# Patient Record
Sex: MALE | Race: WHITE | NOT HISPANIC OR LATINO | Employment: FULL TIME | URBAN - METROPOLITAN AREA
[De-identification: names, ages, dates, MRNs, and addresses within clinical notes are randomized per-mention and may not be internally consistent; named-entity substitution may affect disease eponyms.]

---

## 2017-10-20 ENCOUNTER — GENERIC CONVERSION - ENCOUNTER (OUTPATIENT)
Dept: OTHER | Facility: OTHER | Age: 37
End: 2017-10-20

## 2017-10-20 ENCOUNTER — GENERIC CONVERSION - ENCOUNTER (OUTPATIENT)
Dept: FAMILY MEDICINE CLINIC | Facility: CLINIC | Age: 37
End: 2017-10-20

## 2018-01-22 VITALS — SYSTOLIC BLOOD PRESSURE: 136 MMHG | DIASTOLIC BLOOD PRESSURE: 90 MMHG

## 2018-01-22 VITALS
DIASTOLIC BLOOD PRESSURE: 100 MMHG | BODY MASS INDEX: 33.85 KG/M2 | HEART RATE: 76 BPM | SYSTOLIC BLOOD PRESSURE: 146 MMHG | WEIGHT: 236.44 LBS | HEIGHT: 70 IN | TEMPERATURE: 97.1 F | RESPIRATION RATE: 18 BRPM

## 2018-02-02 ENCOUNTER — OFFICE VISIT (OUTPATIENT)
Dept: FAMILY MEDICINE CLINIC | Facility: CLINIC | Age: 38
End: 2018-02-02
Payer: COMMERCIAL

## 2018-02-02 VITALS
TEMPERATURE: 96.6 F | SYSTOLIC BLOOD PRESSURE: 120 MMHG | RESPIRATION RATE: 16 BRPM | HEART RATE: 72 BPM | BODY MASS INDEX: 34.94 KG/M2 | DIASTOLIC BLOOD PRESSURE: 88 MMHG | HEIGHT: 71 IN | WEIGHT: 249.6 LBS

## 2018-02-02 DIAGNOSIS — J01.90 ACUTE NON-RECURRENT SINUSITIS, UNSPECIFIED LOCATION: Primary | ICD-10-CM

## 2018-02-02 PROCEDURE — 99213 OFFICE O/P EST LOW 20 MIN: CPT | Performed by: NURSE PRACTITIONER

## 2018-02-02 RX ORDER — AMOXICILLIN AND CLAVULANATE POTASSIUM 875; 125 MG/1; MG/1
1 TABLET, FILM COATED ORAL EVERY 12 HOURS SCHEDULED
Qty: 20 TABLET | Refills: 0 | Status: SHIPPED | OUTPATIENT
Start: 2018-02-02 | End: 2018-02-12

## 2018-02-02 NOTE — PROGRESS NOTES
FAMILY PRACTICE OFFICE VISIT       NAME: Neeru Shultz  AGE: 40 y o  SEX: male       : 1980        MRN: [id number]    DATE: 2018  TIME: 9:55 PM    Assessment and Plan     Problem List Items Addressed This Visit     None      Visit Diagnoses     Acute non-recurrent sinusitis, unspecified location    -  Primary    Relevant Medications    amoxicillin-clavulanate (AUGMENTIN) 875-125 mg per tablet            Patient Instructions   Rhinosinusitis   WHAT YOU NEED TO KNOW:   Rhinosinusitis (RS) is inflammation of your nose and sinuses  It commonly begins as a virus, often as a common cold  Viruses usually last 7 to 10 days and do not need treatment  When the virus does not get better on its own, you may have bacterial RS  This means that bacteria have begun to grow inside your sinuses  Acute RS lasts less than 4 weeks  Chronic RS lasts 12 weeks or more  Recurrent RS is when you have 4 or more episodes of RS in one year  DISCHARGE INSTRUCTIONS:   Return to the emergency department if:   · Your eye and eyelid are red, swollen, and painful  · You cannot open your eye  · You have double vision or you cannot see  · Your eyeball bulges out or you cannot move your eye  · You are more sleepy than normal or you notice changes in your ability to think, move, or talk  · You have a stiff neck, a fever, or a bad headache  · You have swelling of your forehead or scalp  Contact your healthcare provider if:   · Your symptoms are worse or do not improve after 3 to 5 days of treatment  · You have questions or concerns about your condition or care  Medicines: You may need any of the following:  · Acetaminophen  decreases pain and fever  It is available without a doctor's order  Ask how much to take and how often to take it  Follow directions  Acetaminophen can cause liver damage if not taken correctly  · NSAIDs , such as ibuprofen, help decrease swelling, pain, and fever   This medicine is available with or without a doctor's order  NSAIDs can cause stomach bleeding or kidney problems in certain people  If you take blood thinner medicine, always ask your healthcare provider if NSAIDs are safe for you  Always read the medicine label and follow directions  · Nasal steroid sprays  decrease inflammation in your nose and sinuses  · Decongestants  reduce swelling and drain mucus in the nose and sinuses  They may help you breathe easier  · Antihistamines  dry mucus in the nose and relieve sneezing  · Antibiotics  treat a bacterial infection and may be needed if your symptoms do not improve or they get worse  · Take your medicine as directed  Contact your healthcare provider if you think your medicine is not helping or if you have side effects  Tell him or her if you are allergic to any medicine  Keep a list of the medicines, vitamins, and herbs you take  Include the amounts, and when and why you take them  Bring the list or the pill bottles to follow-up visits  Carry your medicine list with you in case of an emergency  Self-care:   · Rinse your sinuses  Use a sinus rinse device to rinse your nasal passages with a saline (salt water) solution  This will help thin the mucus in your nose and rinse away pollen and dirt  It will also help reduce swelling so you can breathe normally  Ask your healthcare provider how often to do this  · Breathe in steam   Heat a bowl of water until you see steam  Lean over the bowl and make a tent over your head with a large towel  Breathe deeply for about 20 minutes  Be careful not to get too close to the steam or burn yourself  Do this 3 times a day  You can also breathe deeply when you take a hot shower  · Sleep with your head elevated  Place an extra pillow under your head before you go to sleep to help your sinuses drain  · Drink liquids as directed    Ask your healthcare provider how much liquid to drink each day and which liquids are best for you  Liquids will thin the mucus in your nose and help it drain  Avoid drinks that contain alcohol or caffeine  · Do not smoke, and avoid secondhand smoke  Nicotine and other chemicals in cigarettes and cigars can make your symptoms worse  Ask your healthcare provider for information if you currently smoke and need help to quit  E-cigarettes or smokeless tobacco still contain nicotine  Talk to your healthcare provider before you use these products  Follow up with your healthcare provider as directed: Follow up if your symptoms are worse or not better after 3 to 5 days of treatment  Write down your questions so you remember to ask them during your visits  © 2017 [address] [employer] Information is for End User's use only and may not be sold, redistributed or otherwise used for commercial purposes  All illustrations and images included in CareNotes® are the copyrighted property of [employer]  or Jelani Rincon  The above information is an  only  It is not intended as medical advice for individual conditions or treatments  Talk to your doctor, nurse or pharmacist before following any medical regimen to see if it is safe and effective for you  1  Acute non-recurrent sinusitis, unspecified location  amoxicillin-clavulanate (AUGMENTIN) 875-125 mg per tablet     Symptoms consistent with sinusitis  Recommend treatment with a course of Augmentin 875-125 mg twice daily for 10 days as prescribed  Recommend taking OTC probiotics or eating yogurt daily while taking antibiotics  Continue supportive care:  Rest, increase fluids, warm fluids, honey, and humidification  May use Tylenol or ibuprofen as needed  If symptoms worsen, or if symptoms do not improve over the next 3 days, instructed to call the office         Chief Complaint     Chief Complaint   Patient presents with    Cold Like Symptoms     Pt is here w/ sinus pressure, chest congestion, cough and sneezing times 4 days        History of Present Illness     Patient presents today with nasal congestion, sore throat, cough, sneezing, sinus pressure, and headaches for the past 4 days  He has been using OTC decongestants with no relief  Both his wife and mother in law are sick with similar symptoms  Review of Systems   Review of Systems   Constitutional: Positive for fatigue  Negative for chills and fever  HENT: Positive for congestion, postnasal drip, rhinorrhea, sinus pain, sinus pressure, sneezing and sore throat  Negative for ear pain (pressure, no pain)  Respiratory: Positive for cough  Negative for chest tightness and shortness of breath  Cardiovascular: Positive for chest pain  Gastrointestinal: Negative for diarrhea, nausea and vomiting  Musculoskeletal: Negative for myalgias  Neurological: Positive for headaches  Negative for dizziness  Active Problem List   There is no problem list on file for this patient  Past Medical History:  Past Medical History:   Diagnosis Date    Diverticulitis large intestine        Past Surgical History:  Past Surgical History:   Procedure Laterality Date    VASECTOMY         Family History:  No family history on file  Social History:  Social History     Social History    Marital status: /Civil Union     Spouse name: N/A    Number of children: N/A    Years of education: N/A     Occupational History    Not on file  Social History Main Topics    Smoking status: Never Smoker    Smokeless tobacco: Never Used    Alcohol use Yes      Comment: occasional     Drug use: No    Sexual activity: Not on file     Other Topics Concern    Not on file     Social History Narrative    No narrative on file       I have reviewed the patient's medical history in detail; there are no changes to the history as noted in the electronic medical record      Objective     Vitals:    02/02/18 1108   BP: 120/88   BP Location: Right arm   Patient Position: Sitting Cuff Size: Adult   Pulse: 72   Resp: 16   Temp: (!) 96 6 °F (35 9 °C)   TempSrc: Tympanic   Weight: 113 kg (249 lb 9 6 oz)   Height: 5' 10 5" (1 791 m)     Wt Readings from Last 3 Encounters:   02/02/18 113 kg (249 lb 9 6 oz)   10/20/17 107 kg (236 lb 7 oz)   02/11/16 102 kg (224 lb 6 oz)       Physical Exam   Constitutional: He is oriented to person, place, and time  He appears well-developed and well-nourished  HENT:   Head: Normocephalic and atraumatic  Right Ear: Tympanic membrane and ear canal normal    Left Ear: Tympanic membrane and ear canal normal    Nose: Mucosal edema present  Mouth/Throat: Posterior oropharyngeal erythema (copious amount of thick post nasal drip) present  Eyes: Conjunctivae are normal    Neck: Normal range of motion  Neck supple  Cardiovascular: Normal rate, regular rhythm and normal heart sounds  No murmur heard  Pulmonary/Chest: Effort normal and breath sounds normal    Musculoskeletal: He exhibits no edema  Lymphadenopathy:     He has cervical adenopathy  Neurological: He is alert and oriented to person, place, and time  Psychiatric: He has a normal mood and affect  Nursing note and vitals reviewed  ALLERGIES:  No Known Allergies    Current Medications     Current Outpatient Prescriptions   Medication Sig Dispense Refill    amoxicillin-clavulanate (AUGMENTIN) 875-125 mg per tablet Take 1 tablet by mouth every 12 (twelve) hours for 10 days 20 tablet 0     No current facility-administered medications for this visit            Health Maintenance     Health Maintenance   Topic Date Due    HIV SCREENING  1980    DTaP,Tdap,and Td Vaccines (1 - Tdap) 09/14/1987    INFLUENZA VACCINE  09/01/2017     Immunization History   Administered Date(s) Administered    Influenza Quadrivalent Preservative Free 3 years and older IM 11/08/2014, 11/12/2015    Tuberculin Skin Test-PPD Intradermal 11/10/2015       PAULINA Tejeda

## 2018-02-02 NOTE — PATIENT INSTRUCTIONS
Rhinosinusitis   WHAT YOU NEED TO KNOW:   Rhinosinusitis (RS) is inflammation of your nose and sinuses  It commonly begins as a virus, often as a common cold  Viruses usually last 7 to 10 days and do not need treatment  When the virus does not get better on its own, you may have bacterial RS  This means that bacteria have begun to grow inside your sinuses  Acute RS lasts less than 4 weeks  Chronic RS lasts 12 weeks or more  Recurrent RS is when you have 4 or more episodes of RS in one year  DISCHARGE INSTRUCTIONS:   Return to the emergency department if:   · Your eye and eyelid are red, swollen, and painful  · You cannot open your eye  · You have double vision or you cannot see  · Your eyeball bulges out or you cannot move your eye  · You are more sleepy than normal or you notice changes in your ability to think, move, or talk  · You have a stiff neck, a fever, or a bad headache  · You have swelling of your forehead or scalp  Contact your healthcare provider if:   · Your symptoms are worse or do not improve after 3 to 5 days of treatment  · You have questions or concerns about your condition or care  Medicines: You may need any of the following:  · Acetaminophen  decreases pain and fever  It is available without a doctor's order  Ask how much to take and how often to take it  Follow directions  Acetaminophen can cause liver damage if not taken correctly  · NSAIDs , such as ibuprofen, help decrease swelling, pain, and fever  This medicine is available with or without a doctor's order  NSAIDs can cause stomach bleeding or kidney problems in certain people  If you take blood thinner medicine, always ask your healthcare provider if NSAIDs are safe for you  Always read the medicine label and follow directions  · Nasal steroid sprays  decrease inflammation in your nose and sinuses  · Decongestants  reduce swelling and drain mucus in the nose and sinuses   They may help you breathe easier  · Antihistamines  dry mucus in the nose and relieve sneezing  · Antibiotics  treat a bacterial infection and may be needed if your symptoms do not improve or they get worse  · Take your medicine as directed  Contact your healthcare provider if you think your medicine is not helping or if you have side effects  Tell him or her if you are allergic to any medicine  Keep a list of the medicines, vitamins, and herbs you take  Include the amounts, and when and why you take them  Bring the list or the pill bottles to follow-up visits  Carry your medicine list with you in case of an emergency  Self-care:   · Rinse your sinuses  Use a sinus rinse device to rinse your nasal passages with a saline (salt water) solution  This will help thin the mucus in your nose and rinse away pollen and dirt  It will also help reduce swelling so you can breathe normally  Ask your healthcare provider how often to do this  · Breathe in steam   Heat a bowl of water until you see steam  Lean over the bowl and make a tent over your head with a large towel  Breathe deeply for about 20 minutes  Be careful not to get too close to the steam or burn yourself  Do this 3 times a day  You can also breathe deeply when you take a hot shower  · Sleep with your head elevated  Place an extra pillow under your head before you go to sleep to help your sinuses drain  · Drink liquids as directed  Ask your healthcare provider how much liquid to drink each day and which liquids are best for you  Liquids will thin the mucus in your nose and help it drain  Avoid drinks that contain alcohol or caffeine  · Do not smoke, and avoid secondhand smoke  Nicotine and other chemicals in cigarettes and cigars can make your symptoms worse  Ask your healthcare provider for information if you currently smoke and need help to quit  E-cigarettes or smokeless tobacco still contain nicotine   Talk to your healthcare provider before you use these products  Follow up with your healthcare provider as directed: Follow up if your symptoms are worse or not better after 3 to 5 days of treatment  Write down your questions so you remember to ask them during your visits  © 2017 [employer] [employer] Information is for End User's use only and may not be sold, redistributed or otherwise used for commercial purposes  All illustrations and images included in CareNotes® are the copyrighted property of [employer]  or Jelani Rincon  The above information is an  only  It is not intended as medical advice for individual conditions or treatments  Talk to your doctor, nurse or pharmacist before following any medical regimen to see if it is safe and effective for you

## 2018-02-05 NOTE — PROGRESS NOTES
I have reviewed the notes, assessments, and/or procedures performed by PAULINA Manzano, I concur with her/his documentation of Som Wayne

## 2018-09-04 ENCOUNTER — OFFICE VISIT (OUTPATIENT)
Dept: FAMILY MEDICINE CLINIC | Facility: CLINIC | Age: 38
End: 2018-09-04
Payer: COMMERCIAL

## 2018-09-04 VITALS
RESPIRATION RATE: 16 BRPM | HEIGHT: 70 IN | WEIGHT: 240 LBS | TEMPERATURE: 96.5 F | SYSTOLIC BLOOD PRESSURE: 132 MMHG | HEART RATE: 62 BPM | BODY MASS INDEX: 34.36 KG/M2 | DIASTOLIC BLOOD PRESSURE: 80 MMHG

## 2018-09-04 DIAGNOSIS — K57.92 ACUTE DIVERTICULITIS: Primary | ICD-10-CM

## 2018-09-04 PROCEDURE — 1036F TOBACCO NON-USER: CPT | Performed by: FAMILY MEDICINE

## 2018-09-04 PROCEDURE — 99214 OFFICE O/P EST MOD 30 MIN: CPT | Performed by: FAMILY MEDICINE

## 2018-09-04 PROCEDURE — 3008F BODY MASS INDEX DOCD: CPT | Performed by: FAMILY MEDICINE

## 2018-09-04 RX ORDER — METRONIDAZOLE 500 MG/1
500 TABLET ORAL 3 TIMES DAILY
Qty: 30 TABLET | Refills: 0 | Status: SHIPPED | OUTPATIENT
Start: 2018-09-04 | End: 2018-09-14

## 2018-09-04 RX ORDER — CIPROFLOXACIN 500 MG/1
500 TABLET, FILM COATED ORAL EVERY 12 HOURS SCHEDULED
Qty: 20 TABLET | Refills: 0 | Status: SHIPPED | OUTPATIENT
Start: 2018-09-04 | End: 2018-09-14

## 2018-09-04 NOTE — PROGRESS NOTES
FAMILY PRACTICE OFFICE VISIT       NAME: Genesis Shultz  AGE: 40 y o  SEX: male       : 1980        MRN: [id number]        Assessment and Plan     Problem List Items Addressed This Visit     None      Visit Diagnoses     Acute diverticulitis    -  Primary    Relevant Medications    ciprofloxacin (CIPRO) 500 mg tablet    metroNIDAZOLE (FLAGYL) 500 mg tablet    Other Relevant Orders    Ambulatory referral to Colorectal Surgery       Patient presents for evaluation of level quadrant abdominal pain  History and physical are consistent with diverticulitis  Will start patient on combination of Cipro and Flagyl for 10 days  Patient had multiple episodes of diverticulitis within past 6-7 years  He was evaluated with CT scan in  and colonoscopy back in   Patient will start clear liquid diet and advance as tolerated  Referral to Colorectal surgery for evaluation , I believe patient will be a candidate for repeat colonoscopy in 4-8 weeks  Patient will contact me if his symptoms will not improve significantly in 48 hours  There are no Patient Instructions on file for this visit  Chief Complaint     Chief Complaint   Patient presents with    Diverticulitis     Patient is here due to having a flare up x 2 days  History of Present Illness     Abdominal pain and fatigue  For 2-3 days    concerned about  Diverticulitis   last attack 18 months ago    Last BM this am -  Small , no blood, no BRBPR   nauseated last week, no nausea or vomiting now  No recent travel  Increased stress lately    no fever , no chills     Last CT     last colonoscopy  , [facility] Robledo  First attack in    Likely  6-7 th attack   Mother-  Colitis /  Gluten  intolerant    Abdominal Pain   This is a new problem  The current episode started yesterday  The onset quality is gradual  The problem occurs constantly  The problem has been unchanged  The pain is located in the LLQ   The quality of the pain is aching, colicky and cramping  The abdominal pain does not radiate  Associated symptoms include anorexia and a fever  Pertinent negatives include no diarrhea  Nothing aggravates the pain  The pain is relieved by nothing  He has tried nothing for the symptoms  Prior diagnostic workup includes GI consult, lower endoscopy and CT scan  Review of Systems   Review of Systems   Constitutional: Positive for fever  HENT: Negative  Eyes: Negative  Respiratory: Negative  Cardiovascular: Negative  Gastrointestinal: Positive for abdominal pain and anorexia  Negative for diarrhea  Endocrine: Negative  Genitourinary: Negative  Musculoskeletal: Negative  Skin: Negative  Allergic/Immunologic: Negative  Neurological: Negative  Psychiatric/Behavioral: Negative  Active Problem List   There is no problem list on file for this patient  Past Medical History:  Past Medical History:   Diagnosis Date    Diverticulitis large intestine        Past Surgical History:  Past Surgical History:   Procedure Laterality Date    VASECTOMY  01/19/2016    Vas Deferens       Family History:  No family history on file  Social History:  Social History     Social History    Marital status: /Civil Union     Spouse name: N/A    Number of children: N/A    Years of education: N/A     Occupational History    Not on file       Social History Main Topics    Smoking status: Never Smoker    Smokeless tobacco: Never Used    Alcohol use Yes      Comment: occasional     Drug use: No    Sexual activity: Not on file     Other Topics Concern    Not on file     Social History Narrative    No narrative on file       Objective     Vitals:    09/04/18 1126 09/04/18 1200   BP: 158/100 132/80   Pulse: 62    Resp: 16    Temp: (!) 96 5 °F (35 8 °C)    TempSrc: Tympanic    Weight: 109 kg (240 lb)    Height: 5' 10" (1 778 m)        Vitals:    09/04/18 1200   BP: 132/80   Pulse:    Resp:    Temp:      Wt Readings from Last 3 Encounters:   09/04/18 109 kg (240 lb)   02/02/18 113 kg (249 lb 9 6 oz)   10/20/17 107 kg (236 lb 7 oz)       Physical Exam   Constitutional: He is oriented to person, place, and time  He appears well-developed and well-nourished  HENT:   Head: Normocephalic and atraumatic  Eyes: Conjunctivae are normal    Neck: Neck supple  Carotid bruit is not present  Cardiovascular: Normal rate, regular rhythm and normal heart sounds  No murmur heard  Pulmonary/Chest: Effort normal and breath sounds normal  No respiratory distress  He has no wheezes  He has no rales  Abdominal: Normal appearance and bowel sounds are normal  He exhibits no abdominal bruit  There is tenderness in the left lower quadrant  There is rebound  There is no rigidity, no guarding and no CVA tenderness  Musculoskeletal: Normal range of motion  He exhibits no edema  Neurological: He is alert and oriented to person, place, and time  No cranial nerve deficit  Psychiatric: He has a normal mood and affect  His behavior is normal    Nursing note and vitals reviewed        Pertinent Laboratory/Diagnostic Studies:  Lab Results   Component Value Date    GLUCOSE 108 02/21/2014    BUN 21 02/21/2014    CREATININE 0 94 02/21/2014    CALCIUM 8 2 (L) 02/21/2014     02/21/2014    K 4 1 02/21/2014    CO2 31 02/21/2014     02/21/2014     Lab Results   Component Value Date    ALT 30 02/21/2014    AST 18 02/21/2014    ALKPHOS 56 02/21/2014    BILITOT 1 4 (H) 02/21/2014       Lab Results   Component Value Date    WBC 10 28 (H) 02/21/2014    HGB 15 6 02/21/2014    HCT 44 0 02/21/2014    MCV 86 02/21/2014     02/21/2014       No results found for: TSH    No results found for: CHOL  No results found for: TRIG  No results found for: HDL  No results found for: LDLCALC  No results found for: HGBA1C    Results for orders placed or performed in visit on 03/21/16   Semen analysis, post-vasectomy   Result Value Ref Range    Volume 5 4 (H) 1 0 - 5 0 mL    Viscosity 4 3 - 4    WBC's 0 <1 hpf    pH, Fluid 8 5 7 2 - 8 6    Specimen Collection Home     Post Vas Comment Azoospermia  Specimen Pelleted        Orders Placed This Encounter   Procedures    Ambulatory referral to Colorectal Surgery       ALLERGIES:  No Known Allergies    Current Medications     Current Outpatient Prescriptions   Medication Sig Dispense Refill    ciprofloxacin (CIPRO) 500 mg tablet Take 1 tablet (500 mg total) by mouth every 12 (twelve) hours for 10 days 20 tablet 0    metroNIDAZOLE (FLAGYL) 500 mg tablet Take 1 tablet (500 mg total) by mouth 3 (three) times a day for 10 days 30 tablet 0     No current facility-administered medications for this visit            Health Maintenance     Health Maintenance   Topic Date Due    INFLUENZA VACCINE  09/04/2019 (Originally 9/1/2018)    DTaP,Tdap,and Td Vaccines (1 - Tdap) 09/04/2019 (Originally 9/14/2001)    Depression Screening PHQ  09/04/2019     Immunization History   Administered Date(s) Administered    Influenza Quadrivalent Preservative Free 3 years and older IM 11/08/2014, 11/12/2015    Tuberculin Skin Test-PPD Intradermal 11/10/2015       Vivek Jay MD

## 2018-09-07 ENCOUNTER — TELEPHONE (OUTPATIENT)
Dept: FAMILY MEDICINE CLINIC | Facility: CLINIC | Age: 38
End: 2018-09-07

## 2018-09-07 NOTE — TELEPHONE ENCOUNTER
If his symptoms are not improving-we need to proceed with CT scan of abdomen and pelvis  I would strongly advised him to go to the nearest emergency room as they can provide him the fast is care with IV fluids, CT scan and possibly IV antibiotics    Please contact patient immediately, thank you

## 2018-09-07 NOTE — TELEPHONE ENCOUNTER
Patient is aware of MD instructions  Patient states that his symptoms have improved since earlier and doesn't think he needs to go to the ER at this time   But the patient will give the office a call if he decides to go to the hospital

## 2018-12-12 ENCOUNTER — OFFICE VISIT (OUTPATIENT)
Dept: FAMILY MEDICINE CLINIC | Facility: CLINIC | Age: 38
End: 2018-12-12
Payer: COMMERCIAL

## 2018-12-12 VITALS
HEART RATE: 56 BPM | TEMPERATURE: 97.9 F | HEIGHT: 70 IN | BODY MASS INDEX: 32.81 KG/M2 | DIASTOLIC BLOOD PRESSURE: 70 MMHG | RESPIRATION RATE: 16 BRPM | SYSTOLIC BLOOD PRESSURE: 122 MMHG | WEIGHT: 229.2 LBS

## 2018-12-12 DIAGNOSIS — J32.9 SINUSITIS, UNSPECIFIED CHRONICITY, UNSPECIFIED LOCATION: Primary | ICD-10-CM

## 2018-12-12 PROCEDURE — 1036F TOBACCO NON-USER: CPT | Performed by: FAMILY MEDICINE

## 2018-12-12 PROCEDURE — 99213 OFFICE O/P EST LOW 20 MIN: CPT | Performed by: FAMILY MEDICINE

## 2018-12-12 PROCEDURE — 3008F BODY MASS INDEX DOCD: CPT | Performed by: FAMILY MEDICINE

## 2018-12-12 RX ORDER — CEFUROXIME AXETIL 500 MG/1
500 TABLET ORAL EVERY 12 HOURS SCHEDULED
Qty: 20 TABLET | Refills: 0 | Status: SHIPPED | OUTPATIENT
Start: 2018-12-12 | End: 2018-12-22

## 2018-12-12 NOTE — PROGRESS NOTES
FAMILY PRACTICE OFFICE VISIT       NAME: Roxi Salgado  AGE: 45 y o  SEX: male       : 1980        MRN: [id number]    DATE: 2018  TIME: 10:45 AM    Assessment and Plan     Problem List Items Addressed This Visit     Sinusitis - Primary    Relevant Medications    cefuroxime (CEFTIN) 500 mg tablet        Patient given prescription for Ceftin 500 mg 1 b i d  For 10 days  He may continue to take his Mucinex 1 b i d  As needed for symptomatic relief  There are no Patient Instructions on file for this visit  Chief Complaint     Chief Complaint   Patient presents with    Sinusitis    Sore Throat       History of Present Illness     HPI    Review of Systems   Review of Systems   Constitutional: Positive for fatigue  Negative for fever  HENT: Positive for congestion and sinus pressure  Respiratory: Negative  Cardiovascular: Negative  Gastrointestinal: Negative  Skin: Negative  Active Problem List     Patient Active Problem List   Diagnosis    Sinusitis       Past Medical History:  Past Medical History:   Diagnosis Date    Diverticulitis large intestine        Past Surgical History:  Past Surgical History:   Procedure Laterality Date    VASECTOMY  2016    Vas Deferens       Family History:  History reviewed  No pertinent family history  Social History:  Social History     Social History    Marital status: /Civil Union     Spouse name: N/A    Number of children: N/A    Years of education: N/A     Occupational History    Not on file       Social History Main Topics    Smoking status: Never Smoker    Smokeless tobacco: Never Used    Alcohol use Yes      Comment: occasional     Drug use: No    Sexual activity: Not on file     Other Topics Concern    Not on file     Social History Narrative    No narrative on file       Objective     Vitals:    18 1032   BP: 122/70   Pulse: 56   Resp: 16   Temp: 97 9 °F (36 6 °C)     Wt Readings from Last 3 Encounters:   12/12/18 104 kg (229 lb 3 2 oz)   09/04/18 109 kg (240 lb)   02/02/18 113 kg (249 lb 9 6 oz)       Physical Exam   Constitutional: No distress  HENT:   Mouth/Throat: Oropharynx is clear and moist  No oropharyngeal exudate  Tympanic membranes within normal limits bilaterally  Increased mucosal swelling bilateral nasal passages   Cardiovascular:   Regular rate and rhythm with no murmurs   Pulmonary/Chest:   Lungs are clear to auscultation without wheezes,rales, or rhonchi   Lymphadenopathy:     He has no cervical adenopathy  Skin: No rash noted  Pertinent Laboratory/Diagnostic Studies:  Lab Results   Component Value Date    GLUCOSE 108 02/21/2014    BUN 21 02/21/2014    CREATININE 0 94 02/21/2014    CALCIUM 8 2 (L) 02/21/2014     02/21/2014    K 4 1 02/21/2014    CO2 31 02/21/2014     02/21/2014     Lab Results   Component Value Date    ALT 30 02/21/2014    AST 18 02/21/2014    ALKPHOS 56 02/21/2014    BILITOT 1 4 (H) 02/21/2014       Lab Results   Component Value Date    WBC 10 28 (H) 02/21/2014    HGB 15 6 02/21/2014    HCT 44 0 02/21/2014    MCV 86 02/21/2014     02/21/2014       No results found for: TSH    No results found for: CHOL  No results found for: TRIG  No results found for: HDL  No results found for: LDLCALC  No results found for: HGBA1C    Results for orders placed or performed in visit on 03/21/16   Semen analysis, post-vasectomy   Result Value Ref Range    Volume 5 4 (H) 1 0 - 5 0 mL    Viscosity 4 3 - 4    WBC's 0 <1 hpf    pH, Fluid 8 5 7 2 - 8 6    Specimen Collection Home     Post Vas Comment Azoospermia  Specimen Pelleted        No orders of the defined types were placed in this encounter        ALLERGIES:  No Known Allergies    Current Medications     Current Outpatient Prescriptions   Medication Sig Dispense Refill    cefuroxime (CEFTIN) 500 mg tablet Take 1 tablet (500 mg total) by mouth every 12 (twelve) hours for 10 days 20 tablet 0     No current facility-administered medications for this visit            [facility]     Health Maintenance   Topic Date Due    INFLUENZA VACCINE  09/04/2019 (Originally 7/1/2018)    DTaP,Tdap,and Td Vaccines (1 - Tdap) 09/04/2019 (Originally 9/14/2001)    Depression Screening PHQ  09/04/2019     Immunization History   Administered Date(s) Administered    Influenza Quadrivalent Preservative Free 3 years and older IM 11/08/2014, 11/12/2015    Tuberculin Skin Test-PPD Intradermal 53/65/5672       Sil Carcamo MD

## 2019-04-09 ENCOUNTER — OFFICE VISIT (OUTPATIENT)
Dept: FAMILY MEDICINE CLINIC | Facility: CLINIC | Age: 39
End: 2019-04-09
Payer: COMMERCIAL

## 2019-04-09 VITALS
HEART RATE: 68 BPM | RESPIRATION RATE: 16 BRPM | WEIGHT: 226.6 LBS | DIASTOLIC BLOOD PRESSURE: 84 MMHG | BODY MASS INDEX: 32.44 KG/M2 | HEIGHT: 70 IN | TEMPERATURE: 97.3 F | SYSTOLIC BLOOD PRESSURE: 132 MMHG

## 2019-04-09 DIAGNOSIS — K57.90 DIVERTICULAR DISEASE: ICD-10-CM

## 2019-04-09 DIAGNOSIS — K57.92 ACUTE DIVERTICULITIS: Primary | ICD-10-CM

## 2019-04-09 PROCEDURE — 99213 OFFICE O/P EST LOW 20 MIN: CPT | Performed by: FAMILY MEDICINE

## 2019-04-09 PROCEDURE — 3008F BODY MASS INDEX DOCD: CPT | Performed by: FAMILY MEDICINE

## 2019-04-09 RX ORDER — CIPROFLOXACIN 500 MG/1
500 TABLET, FILM COATED ORAL 2 TIMES DAILY
Qty: 14 TABLET | Refills: 0 | Status: SHIPPED | OUTPATIENT
Start: 2019-04-09 | End: 2019-04-16

## 2019-04-09 RX ORDER — AMOXICILLIN AND CLAVULANATE POTASSIUM 875; 125 MG/1; MG/1
1 TABLET, FILM COATED ORAL 2 TIMES DAILY
Qty: 20 TABLET | Refills: 0 | Status: SHIPPED | OUTPATIENT
Start: 2019-04-09 | End: 2019-04-19

## 2019-04-25 PROBLEM — K57.90 DIVERTICULAR DISEASE: Status: ACTIVE | Noted: 2019-04-25

## 2020-03-09 ENCOUNTER — OFFICE VISIT (OUTPATIENT)
Dept: FAMILY MEDICINE CLINIC | Facility: CLINIC | Age: 40
End: 2020-03-09
Payer: COMMERCIAL

## 2020-03-09 VITALS
BODY MASS INDEX: 33.99 KG/M2 | OXYGEN SATURATION: 97 % | HEIGHT: 71 IN | WEIGHT: 242.8 LBS | SYSTOLIC BLOOD PRESSURE: 154 MMHG | HEART RATE: 87 BPM | RESPIRATION RATE: 16 BRPM | TEMPERATURE: 97.5 F | DIASTOLIC BLOOD PRESSURE: 100 MMHG

## 2020-03-09 DIAGNOSIS — J01.90 ACUTE NON-RECURRENT SINUSITIS, UNSPECIFIED LOCATION: Primary | ICD-10-CM

## 2020-03-09 PROCEDURE — 99213 OFFICE O/P EST LOW 20 MIN: CPT | Performed by: NURSE PRACTITIONER

## 2020-03-09 PROCEDURE — 3008F BODY MASS INDEX DOCD: CPT | Performed by: NURSE PRACTITIONER

## 2020-03-09 PROCEDURE — 1036F TOBACCO NON-USER: CPT | Performed by: NURSE PRACTITIONER

## 2020-03-09 RX ORDER — AMOXICILLIN AND CLAVULANATE POTASSIUM 875; 125 MG/1; MG/1
1 TABLET, FILM COATED ORAL EVERY 12 HOURS SCHEDULED
Qty: 14 TABLET | Refills: 0 | Status: SHIPPED | OUTPATIENT
Start: 2020-03-09 | End: 2020-03-16

## 2020-03-09 NOTE — PROGRESS NOTES
FAMILY PRACTICE OFFICE VISIT       NAME: Thee Reyes  AGE: 44 y o  SEX: male       : 1980        MRN: [id number]    DATE: 3/9/2020    Assessment and Plan     Problem List Items Addressed This Visit        Respiratory    Sinusitis - Primary    Relevant Medications    amoxicillin-clavulanate (AUGMENTIN) 875-125 mg per tablet          1  Acute non-recurrent sinusitis, unspecified location  amoxicillin-clavulanate (AUGMENTIN) 875-125 mg per tablet     This 49-year-old male presents today for symptoms consistent with sinusitis  Recommend treatment with Augmentin 875-125 mg twice daily for 7 days  Take this medication with food  Recommend taking an over-the-counter probiotic, or eating yogurt daily while taking antibiotics  Continue supportive care:  Rest, fluids, warm fluids  Nasal saline rinses  If symptoms should worsen, or if symptoms are not improving over the next 3-4 days, he is instructed to call  BP is elevated today  He notes this does tend to fluctuate with his weight  He has a physical exam scheduled next week with Dr Kalani Doan  BMI Counseling: Body mass index is 33 86 kg/m²  The BMI is above normal  Exercise recommendations include exercising 3-5 times per week  He exercises on a regular basis with weight lifting  Aware his weight is up  Would like to lose 40 pounds  Aware he needs to add more cardio to his work out routine  Chief Complaint     Chief Complaint   Patient presents with    Cold Like Symptoms     Pt is here for sore throat, sinus 1 + wk       History of Present Illness     Thee Reyes is a 49-year-old male presenting today for cold symptoms that have been present for 1 week  He was sick approximately 1 and half weeks ago, got better for 2-3 days, and symptoms returned  Symptoms include sore throat, postnasal drip, green sinus drainage, nasal congestion, swollen glands, and headaches  No fevers    Has been taking over-the-counter Mucinex and in allergy medication  When this initially began around 1 and half weeks ago, his wife was sick, as well as his mother-in-law and father in law  No recent travel  Review of Systems   Review of Systems   Constitutional: Positive for chills and fatigue  Negative for diaphoresis and fever  HENT: Positive for congestion, postnasal drip, rhinorrhea, sinus pressure and sore throat  Negative for ear pain  Respiratory: Negative for cough, chest tightness, shortness of breath and wheezing  Cardiovascular: Negative for chest pain, palpitations and leg swelling  Gastrointestinal: Negative  Neurological: Positive for headaches  Negative for dizziness  Hematological: Positive for adenopathy         Active Problem List     Patient Active Problem List   Diagnosis    Sinusitis    Diverticular disease       Past Medical History:  Past Medical History:   Diagnosis Date    Diverticulitis large intestine        Past Surgical History:  Past Surgical History:   Procedure Laterality Date    COLONOSCOPY      VASECTOMY  01/19/2016    Vas Deferens       Family History:  Family History   Problem Relation Age of Onset    Hodgkin's lymphoma Father        Social History:  Social History     Socioeconomic History    Marital status: /Civil Union     Spouse name: Not on file    Number of children: Not on file    Years of education: Not on file    Highest education level: Not on file   Occupational History    Not on file   Social Needs    Financial resource strain: Not on file    Food insecurity:     Worry: Not on file     Inability: Not on file    Transportation needs:     Medical: Not on file     Non-medical: Not on file   Tobacco Use    Smoking status: Never Smoker    Smokeless tobacco: Never Used   Substance and Sexual Activity    Alcohol use: Not Currently     Comment: occasional     Drug use: No    Sexual activity: Not on file   Lifestyle    Physical activity:     Days per week: Not on file Minutes per session: Not on file    Stress: Not on file   Relationships    Social connections:     Talks on phone: Not on file     Gets together: Not on file     Attends Worship service: Not on file     Active member of club or organization: Not on file     Attends meetings of clubs or organizations: Not on file     Relationship status: Not on file    Intimate partner violence:     Fear of current or ex partner: Not on file     Emotionally abused: Not on file     Physically abused: Not on file     Forced sexual activity: Not on file   Other Topics Concern    Not on file   Social History Narrative    Not on file       I have reviewed the patient's medical history in detail; there are no changes to the history as noted in the electronic medical record  Objective     Vitals:    03/09/20 1331 03/09/20 1354   BP: (!) 150/110 154/100   Pulse: 87    Resp: 16    Temp: 97 5 °F (36 4 °C)    TempSrc: Tympanic    SpO2: 97%    Weight: 110 kg (242 lb 12 8 oz)    Height: 5' 11" (1 803 m)      Wt Readings from Last 3 Encounters:   03/09/20 110 kg (242 lb 12 8 oz)   04/25/19 105 kg (231 lb)   04/09/19 103 kg (226 lb 9 6 oz)     Physical Exam   Constitutional: He appears well-developed and well-nourished  He does not appear ill  No distress  HENT:   Head: Normocephalic and atraumatic  Right Ear: Tympanic membrane and ear canal normal    Left Ear: Tympanic membrane and ear canal normal    Nose: Mucosal edema (excoriated nasal mucosa) present  Mouth/Throat: Uvula is midline  Posterior oropharyngeal erythema (and thick yellow post nasal drip) present  No oropharyngeal exudate or posterior oropharyngeal edema  Eyes: Conjunctivae are normal    Neck: Normal range of motion  Neck supple  Cardiovascular: Normal rate, regular rhythm and normal heart sounds  No murmur heard  Pulmonary/Chest: Effort normal and breath sounds normal    Musculoskeletal: He exhibits no edema     Lymphadenopathy:     He has cervical adenopathy  Psychiatric: He has a normal mood and affect  Nursing note and vitals reviewed  ALLERGIES:  No Known Allergies    Current Medications     Current Outpatient Medications   Medication Sig Dispense Refill    amoxicillin-clavulanate (AUGMENTIN) 875-125 mg per tablet Take 1 tablet by mouth every 12 (twelve) hours for 7 days 14 tablet 0     No current facility-administered medications for this visit            Health Maintenance     Health Maintenance   Topic Date Due    HIV Screening  09/14/1995    BMI: Followup Plan  09/14/1998    Annual Physical  09/14/1998    Depression Screening PHQ  03/09/2021    BMI: Adult  03/09/2021    DTaP,Tdap,and Td Vaccines (2 - Td) 03/20/2029    CRC Screening: Colonoscopy  05/13/2029    Pneumococcal Vaccine: 65+ Years (1 of 2 - PCV13) 09/14/2045    Influenza Vaccine  Completed    Pneumococcal Vaccine: Pediatrics (0 to 5 Years) and At-Risk Patients (6 to 59 Years)  Aged Out    HIB Vaccine  Aged Out    Hepatitis B Vaccine  Aged Out    IPV Vaccine  Aged Out    Hepatitis A Vaccine  Aged Out    Meningococcal ACWY Vaccine  Aged Out    HPV Vaccine  Aged [employer] History   Administered Date(s) Administered    INFLUENZA 10/25/2019    Influenza Quadrivalent Preservative Free 3 years and older IM 11/08/2014, 11/12/2015    Tdap 03/20/2019    Tuberculin Skin Test-PPD Intradermal 11/10/2015    influenza, injectable, quadrivalent 10/20/2018       PAULINA Castellano

## 2020-03-18 ENCOUNTER — OFFICE VISIT (OUTPATIENT)
Dept: FAMILY MEDICINE CLINIC | Facility: CLINIC | Age: 40
End: 2020-03-18
Payer: COMMERCIAL

## 2020-03-18 VITALS
SYSTOLIC BLOOD PRESSURE: 142 MMHG | DIASTOLIC BLOOD PRESSURE: 84 MMHG | WEIGHT: 245.6 LBS | HEIGHT: 71 IN | HEART RATE: 84 BPM | BODY MASS INDEX: 34.38 KG/M2 | RESPIRATION RATE: 16 BRPM | TEMPERATURE: 97.1 F | OXYGEN SATURATION: 97 %

## 2020-03-18 DIAGNOSIS — M25.511 CHRONIC RIGHT SHOULDER PAIN: ICD-10-CM

## 2020-03-18 DIAGNOSIS — R03.0 ELEVATED BLOOD PRESSURE READING: ICD-10-CM

## 2020-03-18 DIAGNOSIS — G89.29 CHRONIC RIGHT SHOULDER PAIN: ICD-10-CM

## 2020-03-18 DIAGNOSIS — K57.90 DIVERTICULAR DISEASE: ICD-10-CM

## 2020-03-18 DIAGNOSIS — Z00.00 ENCOUNTER FOR WELLNESS EXAMINATION IN ADULT: Primary | ICD-10-CM

## 2020-03-18 PROCEDURE — 3008F BODY MASS INDEX DOCD: CPT | Performed by: FAMILY MEDICINE

## 2020-03-18 PROCEDURE — 99395 PREV VISIT EST AGE 18-39: CPT | Performed by: FAMILY MEDICINE

## 2020-03-18 NOTE — PROGRESS NOTES
FAMILY PRACTICE OFFICE VISIT       NAME: Jacob Mckeon  AGE: 44 y o  SEX: male       : 1980        MRN: [id number]        Assessment and Plan     Problem List Items Addressed This Visit        Digestive    Diverticular disease    Relevant Orders    CBC    Comprehensive metabolic panel      Other Visit Diagnoses     Encounter for wellness examination in adult    -  Primary    Chronic right shoulder pain        Relevant Orders    Ambulatory referral to Orthopedic Surgery    Elevated blood pressure reading        Relevant Orders    CBC    Comprehensive metabolic panel    Lipid Panel with Direct LDL reflex    TSH, 3rd generation       Patient presents for annual well exam   We will  proceed with routine blood work  Blood pressure is on the higher side of normal   Patient denies symptoms of chest pain, palpitations, shortness of breath, dizziness or headaches  Patient admits that his blood pressure usually fluctuates and trends to go up with weight gain  He will modify his exercise routine to decrease weight lifting and consumption of caffeine containing nutritional supplements  Patient will increase aerobic exercise  He is motivated to lose weight  Patient will start checking blood pressures at the local pharmacy and will stop by in the office for blood pressure recheck at the time of his daughters well exam in 3 months  Goal for blood pressure is to be below 130/80  We will  hold off antihypertensive medications for now, will reassess blood pressure pending patient's lifestyle changes and weight loss  Referral to [facility] Orthopedic team for evaluation of chronic right shoulder pain  There are no Patient Instructions on file for this visit  Discussed with the patient and all questioned fully answered  He will call me if any problems arise  M*Modal software was used to dictate this note  It may contain errors with dictating incorrect words/spelling   Please contact provider directly with any questions  Chief Complaint     Chief Complaint   Patient presents with    Annual Exam       History of Present Illness     Patient presents for annual well exam   He is accompanied by his wife and daughter  No daily prescription medications  Patient has gained 14 lb within past few months  Blood pressure usually fluctuates with weight  History of white coat hypertension and borderline blood pressures within past few years  Patient reports family history of hypertension-brother  Who is also overweight  Patient does not drink coffee but uses makes for pre workout nutritional shake which is equivalent to 3 servings of caffeine  Patient uses this makes approximately 3-4 times per week  Patient denies symptoms of chest pain, palpitations, shortness of breath or dizziness  He sleeps well  No exertional symptoms  He is exercising regularly, mainly weight training  History of diverticulitis and diverticulosis  Patient was evaluated by colorectal group and had colonoscopy in May of 2019  He has been experiencing occasional headaches, they usually stress related  Frequent sinus infections  Patient uses Zyrtec for seasonal allergies  Recent office visit with our nurse practitioner on May 9th, patient was treated with Augmentin  Sinusitis symptoms have improved     Patient complains of chronic right shoulder pain  Remote history of surgery, decreased range of motion  Patient has modified his weightlifting routine  Review of Systems   Review of Systems   Constitutional: Negative  HENT: Negative  Eyes: Negative  Respiratory: Negative  Cardiovascular: Negative  Gastrointestinal: Negative  Interm   heartburn   Endocrine: Negative  Genitourinary: Negative  Musculoskeletal: Positive for arthralgias  Skin: Negative  Allergic/Immunologic: Negative  Neurological: Negative  Hematological: Negative  Psychiatric/Behavioral: Negative  Active Problem List     Patient Active Problem List   Diagnosis    Sinusitis    Diverticular disease       Past Medical History:  Past Medical History:   Diagnosis Date    Diverticulitis large intestine        Past Surgical History:  Past Surgical History:   Procedure Laterality Date    COLONOSCOPY      VASECTOMY  01/19/2016    Vas Deferens       Family History:  Family History   Problem Relation Age of Onset    Hodgkin's lymphoma Father        Social History:  Social History     Socioeconomic History    Marital status: /Civil Union     Spouse name: Not on file    Number of children: Not on file    Years of education: Not on file    Highest education level: Not on file   Occupational History    Not on file   Social Needs    Financial resource strain: Not on file    Food insecurity:     Worry: Not on file     Inability: Not on file    Transportation needs:     Medical: Not on file     Non-medical: Not on file   Tobacco Use    Smoking status: Never Smoker    Smokeless tobacco: Never Used   Substance and Sexual Activity    Alcohol use: Not Currently     Comment: occasional     Drug use: No    Sexual activity: Not on file   Lifestyle    Physical activity:     Days per week: Not on file     Minutes per session: Not on file    Stress: Not on file   Relationships    Social connections:     Talks on phone: Not on file     Gets together: Not on file     Attends Tenriism service: Not on file     Active member of club or organization: Not on file     Attends meetings of clubs or organizations: Not on file     Relationship status: Not on file    Intimate partner violence:     Fear of current or ex partner: Not on file     Emotionally abused: Not on file     Physically abused: Not on file     Forced sexual activity: Not on file   Other Topics Concern    Not on file   Social History Narrative    Not on file           Objective     Vitals:    03/18/20 1503 03/18/20 1557   BP: (!) 142/10 142/84   BP Location: Left arm    Patient Position: Sitting    Cuff Size: Large    Pulse: 84    Resp: 16    Temp: (!) 97 1 °F (36 2 °C)    TempSrc: Tympanic    SpO2: 97%    Weight: 111 kg (245 lb 9 6 oz)    Height: 5' 11" (1 803 m)      Wt Readings from Last 3 Encounters:   03/18/20 111 kg (245 lb 9 6 oz)   03/09/20 110 kg (242 lb 12 8 oz)   04/25/19 105 kg (231 lb)       Physical Exam   Constitutional: He is oriented to person, place, and time  He appears well-developed and well-nourished  HENT:   Head: Normocephalic and atraumatic  Eyes: Conjunctivae are normal    Neck: Neck supple  Carotid bruit is not present  Cardiovascular: Normal rate, regular rhythm and normal heart sounds  No murmur heard  Pulmonary/Chest: Effort normal and breath sounds normal  No respiratory distress  He has no wheezes  He has no rales  Abdominal: Bowel sounds are normal  He exhibits no distension and no abdominal bruit  Musculoskeletal: Normal range of motion  He exhibits no edema  Right shoulder:  Painful abduction and extension, limited to 100-120 degrees   Neurological: He is alert and oriented to person, place, and time  No cranial nerve deficit  Skin: Skin is warm  No rash noted  Psychiatric: He has a normal mood and affect  His behavior is normal    Nursing note and vitals reviewed        Pertinent Laboratory/Diagnostic Studies:  Lab Results   Component Value Date    GLUCOSE 108 02/21/2014    BUN 21 02/21/2014    CREATININE 0 94 02/21/2014    CALCIUM 8 2 (L) 02/21/2014     02/21/2014    K 4 1 02/21/2014    CO2 31 02/21/2014     02/21/2014     Lab Results   Component Value Date    ALT 30 02/21/2014    AST 18 02/21/2014    ALKPHOS 56 02/21/2014    BILITOT 1 4 (H) 02/21/2014       Lab Results   Component Value Date    WBC 10 28 (H) 02/21/2014    HGB 15 6 02/21/2014    HCT 44 0 02/21/2014    MCV 86 02/21/2014     02/21/2014       No results found for: TSH    No results found for: CHOL  No results found for: TRIG  No results found for: HDL  No results found for: LDLCALC  No results found for: HGBA1C    Results for orders placed or performed in visit on 03/21/16   Semen analysis, post-vasectomy   Result Value Ref Range    Volume 5 4 (H) 1 0 - 5 0 mL    Viscosity 4 3 - 4    WBC's 0 <1 hpf    pH, Fluid 8 5 7 2 - 8 6    Specimen Collection Home     Post Vas Comment Azoospermia  Specimen Pelleted        Orders Placed This Encounter   Procedures    CBC    Comprehensive metabolic panel    Lipid Panel with Direct LDL reflex    TSH, 3rd generation    Ambulatory referral to Orthopedic Surgery       ALLERGIES:  No Known Allergies    Current Medications     No current outpatient medications on file  No current facility-administered medications for this visit  There are no discontinued medications      Health Maintenance     Health Maintenance   Topic Date Due    HIV Screening  09/14/1995    Annual Physical  09/14/1998    Depression Screening PHQ  03/09/2021    BMI: Followup Plan  03/09/2021    BMI: Adult  03/09/2021    DTaP,Tdap,and Td Vaccines (2 - Td) 03/20/2029    CRC Screening: Colonoscopy  05/13/2029    Pneumococcal Vaccine: 65+ Years (1 of 2 - PCV13) 09/14/2045    Influenza Vaccine  Completed    Pneumococcal Vaccine: Pediatrics (0 to 5 Years) and At-Risk Patients (6 to 59 Years)  Aged Out    HIB Vaccine  Aged Out    Hepatitis B Vaccine  Aged Out    IPV Vaccine  Aged Out    Hepatitis A Vaccine  Aged Out    Meningococcal ACWY Vaccine  Aged Out    HPV Vaccine  Aged [employer] History   Administered Date(s) Administered    INFLUENZA 10/25/2019    Influenza Quadrivalent Preservative Free 3 years and older IM 11/08/2014, 11/12/2015    Tdap 03/20/2019    Tuberculin Skin Test-PPD Intradermal 11/10/2015    influenza, injectable, quadrivalent 10/20/2018       Mady Naik MD

## 2020-09-05 ENCOUNTER — HOSPITAL ENCOUNTER (EMERGENCY)
Facility: HOSPITAL | Age: 40
Discharge: HOME | End: 2020-09-05
Attending: EMERGENCY MEDICINE | Admitting: EMERGENCY MEDICINE
Payer: COMMERCIAL

## 2020-09-05 VITALS
SYSTOLIC BLOOD PRESSURE: 166 MMHG | DIASTOLIC BLOOD PRESSURE: 90 MMHG | HEART RATE: 83 BPM | OXYGEN SATURATION: 94 % | TEMPERATURE: 97.8 F | RESPIRATION RATE: 18 BRPM

## 2020-09-05 DIAGNOSIS — S91.119A TOE LACERATION: Primary | ICD-10-CM

## 2020-09-05 PROCEDURE — 99284 EMERGENCY DEPT VISIT MOD MDM: CPT | Performed by: EMERGENCY MEDICINE

## 2020-09-05 PROCEDURE — 12001 RPR S/N/AX/GEN/TRNK 2.5CM/<: CPT | Performed by: EMERGENCY MEDICINE

## 2020-09-05 PROCEDURE — 99283 EMERGENCY DEPT VISIT LOW MDM: CPT

## 2020-09-05 PROCEDURE — 90471 IMMUNIZATION ADMIN: CPT

## 2020-09-05 PROCEDURE — 90715 TDAP VACCINE 7 YRS/> IM: CPT | Performed by: EMERGENCY MEDICINE

## 2020-09-05 PROCEDURE — 96372 THER/PROPH/DIAG INJ SC/IM: CPT

## 2020-09-05 RX ORDER — KETOROLAC TROMETHAMINE 30 MG/ML
15 INJECTION, SOLUTION INTRAMUSCULAR; INTRAVENOUS ONCE
Status: COMPLETED | OUTPATIENT
Start: 2020-09-05 | End: 2020-09-05

## 2020-09-05 RX ORDER — ACETAMINOPHEN 325 MG/1
975 TABLET ORAL ONCE
Status: COMPLETED | OUTPATIENT
Start: 2020-09-05 | End: 2020-09-05

## 2020-09-05 RX ORDER — GINSENG 100 MG
1 CAPSULE ORAL ONCE
Status: DISCONTINUED | OUTPATIENT
Start: 2020-09-05 | End: 2020-09-05 | Stop reason: HOSPADM

## 2020-09-05 RX ORDER — LIDOCAINE HYDROCHLORIDE 10 MG/ML
10 INJECTION, SOLUTION EPIDURAL; INFILTRATION; INTRACAUDAL; PERINEURAL ONCE
Status: COMPLETED | OUTPATIENT
Start: 2020-09-05 | End: 2020-09-05

## 2020-09-05 RX ADMIN — TETANUS TOXOID, REDUCED DIPHTHERIA TOXOID AND ACELLULAR PERTUSSIS VACCINE, ADSORBED 0.5 ML: 5; 2.5; 8; 8; 2.5 SUSPENSION INTRAMUSCULAR at 20:42

## 2020-09-05 RX ADMIN — LIDOCAINE HYDROCHLORIDE 10 ML: 10 INJECTION, SOLUTION EPIDURAL; INFILTRATION; INTRACAUDAL; PERINEURAL at 20:42

## 2020-09-05 RX ADMIN — ACETAMINOPHEN 975 MG: 325 TABLET, FILM COATED ORAL at 20:18

## 2020-09-05 RX ADMIN — KETOROLAC TROMETHAMINE 15 MG: 30 INJECTION, SOLUTION INTRAMUSCULAR at 20:18

## 2020-09-06 NOTE — ED ATTENDING ATTESTATION
9/5/2020  I, Viki Chaney MD, saw and evaluated the patient  I have discussed the patient with the resident/non-physician practitioner and agree with the resident's/non-physician practitioner's findings, Plan of Care, and MDM as documented in the resident's/non-physician practitioner's note, except where noted  All available labs and Radiology studies were reviewed  I was present for key portions of any procedure(s) performed by the resident/non-physician practitioner and I was immediately available to provide assistance  At this point I agree with the current assessment done in the Emergency Department    I have conducted an independent evaluation of this patient a history and physical is as follows:  Lac on the great toe right   Plantar aspect   Plastic edge on his deck   No fb sensation    Wearing sandles    Bleeding controlled   Exam   laceration noted on the right great toe there is a small avulsion of skin or there is a small area that is a little bit deeper no foreign body neurovascular status intact  Patient will have the wound repaired will be irrigated pain control tetanus will be updated   ED Course         Critical Care Time  Procedures

## 2020-09-08 NOTE — ED PROVIDER NOTES
History  Chief Complaint   Patient presents with    Toe Laceration     right great toe laceration     Patient is a 51-year-old male who presents for evaluation of right great toe laceration  Patient was walking on his deck and he stepped on a sharp piece of plastic  He suffered a laceration to the plantar aspect of the right great toe  Bleeding was controlled relatively quickly  No foreign body  Tetanus up-to-date  He denies any other injuries  He denies headache, nausea, vomiting, chest pain, dyspnea, abdominal pain  No blood thinners or antiplatelet agents  None       Past Medical History:   Diagnosis Date    Diverticulitis large intestine        Past Surgical History:   Procedure Laterality Date    COLONOSCOPY      VASECTOMY  01/19/2016    Vas Deferens       Family History   Problem Relation Age of Onset    Hodgkin's lymphoma Father      I have reviewed and agree with the history as documented  E-Cigarette/Vaping     E-Cigarette/Vaping Substances     Social History     Tobacco Use    Smoking status: Never Smoker    Smokeless tobacco: Never Used   Substance Use Topics    Alcohol use: Not Currently     Comment: occasional     Drug use: No        Review of Systems   Constitutional: Negative for chills, diaphoresis, fatigue and fever  HENT: Negative for drooling, facial swelling, sore throat and trouble swallowing  Eyes: Negative for photophobia  Respiratory: Negative for cough, choking, chest tightness, shortness of breath, wheezing and stridor  Cardiovascular: Negative for chest pain, palpitations and leg swelling  Gastrointestinal: Negative for abdominal distention, abdominal pain, diarrhea, nausea and vomiting  Genitourinary: Negative for dysuria  Musculoskeletal: Negative for back pain, neck pain and neck stiffness  Skin: Negative for color change, pallor and rash     Neurological: Negative for dizziness, speech difficulty, weakness, light-headedness, numbness and headaches  Hematological: Negative for adenopathy  Psychiatric/Behavioral: Negative for agitation  All other systems reviewed and are negative  Physical Exam  ED Triage Vitals   Temperature Pulse Respirations Blood Pressure SpO2   09/05/20 2006 09/05/20 2006 09/05/20 2006 09/05/20 2006 09/05/20 2006   97 8 °F (36 6 °C) 83 18 166/90 94 %      Temp Source Heart Rate Source Patient Position - Orthostatic VS BP Location FiO2 (%)   09/05/20 2006 09/05/20 2006 09/05/20 2006 09/05/20 2006 --   Oral Monitor Sitting Left arm       Pain Score       09/05/20 2018       4             Orthostatic Vital Signs  Vitals:    09/05/20 2006   BP: 166/90   Pulse: 83   Patient Position - Orthostatic VS: Sitting       Physical Exam  Constitutional:       General: He is not in acute distress  Appearance: He is well-developed  HENT:      Head: Normocephalic  Eyes:      Pupils: Pupils are equal, round, and reactive to light  Neck:      Musculoskeletal: Normal range of motion and neck supple  Cardiovascular:      Rate and Rhythm: Normal rate and regular rhythm  Heart sounds: Normal heart sounds  No murmur  No friction rub  No gallop  Pulmonary:      Effort: Pulmonary effort is normal       Breath sounds: Normal breath sounds  Abdominal:      General: Bowel sounds are normal  There is no distension  Palpations: Abdomen is soft  Tenderness: There is no abdominal tenderness  There is no guarding  Musculoskeletal: Normal range of motion  Comments: Right great toe:  Patient with horizontal laceration along plantar aspect of right great toe MTP joint  Intact neurovascular status distally  Full range of motion of the right great toe with no tendon involvement  Skin:     Capillary Refill: Capillary refill takes less than 2 seconds  Neurological:      Mental Status: He is alert and oriented to person, place, and time  Cranial Nerves: No cranial nerve deficit        Sensory: No sensory deficit  Motor: No abnormal muscle tone  Psychiatric:         Behavior: Behavior normal          Thought Content: Thought content normal          Judgment: Judgment normal          ED Medications  Medications   ketorolac (TORADOL) injection 15 mg (15 mg Intramuscular Given 9/5/20 2018)   acetaminophen (TYLENOL) tablet 975 mg (975 mg Oral Given 9/5/20 2018)   tetanus-diphtheria-acellular pertussis (BOOSTRIX) IM injection 0 5 mL (0 5 mL Intramuscular Given 9/5/20 2042)   lidocaine (PF) (XYLOCAINE-MPF) 1 % injection 10 mL (10 mL Infiltration Given by Other 9/5/20 2042)       Diagnostic Studies  Results Reviewed     None                 No orders to display         Procedures  Laceration repair    Date/Time: 9/8/2020 12:09 AM  Performed by: Tani Chandler MD  Authorized by: Tani Chandler MD   Consent: Verbal consent obtained  Risks and benefits: risks, benefits and alternatives were discussed  Consent given by: patient  Required items: required blood products, implants, devices, and special equipment available  Patient identity confirmed: verbally with patient and arm band  Body area: lower extremity  Location details: right big toe  Laceration length: 1 cm  Foreign bodies: no foreign bodies  Tendon involvement: none  Nerve involvement: none  Vascular damage: no  Anesthesia: local infiltration    Anesthesia:  Local Anesthetic: lidocaine 1% without epinephrine  Anesthetic total: 2 mL    Sedation:  Patient sedated: no      Wound Dehiscence:  Superficial Wound Dehiscence: simple closure      Procedure Details:  Preparation: Patient was prepped and draped in the usual sterile fashion    Irrigation solution: saline  Irrigation method: syringe  Amount of cleaning: extensive  Debridement: none  Degree of undermining: none  Skin closure: Ethilon  Number of sutures: 2  Technique: simple  Approximation: close  Approximation difficulty: simple  Dressing: gauze roll  Patient tolerance: Patient tolerated the procedure well with no immediate complications            ED Course                                           MDM  Number of Diagnoses or Management Options  Toe laceration: new and does not require workup  Diagnosis management comments: Patient is a 66-year-old male who presents with right great toe laceration  Patient had laceration repaired with 2 sutures  Patient was advised to return to care if he has any new or worsening symptoms  He was instructed to get sutures out in 5-7 days  Disposition  Final diagnoses: Toe laceration     Time reflects when diagnosis was documented in both MDM as applicable and the Disposition within this note     Time User Action Codes Description Comment    9/5/2020  9:12 PM Marylene Zara Add [B39 [id number]] Toe laceration       ED Disposition     ED Disposition Condition Date/Time Comment    Discharge Stable Sat Sep 5, 2020  9:12 PM [facility] discharge to home/self care  Follow-up Information     Follow up With Specialties Details Why Krissy Gregory MD Family Medicine Schedule an appointment as soon as possible for a visit in 5 days For suture removal [id number]  [phone]            There are no discharge medications for this patient  No discharge procedures on file  PDMP Review     None           ED Provider  Attending physically available and evaluated [facility]  I managed the patient along with the ED Attending      Electronically Signed by         Nisreen Bennett MD  09/08/20 0010

## 2020-12-05 ENCOUNTER — OFFICE VISIT (OUTPATIENT)
Dept: URGENT CARE | Facility: CLINIC | Age: 40
End: 2020-12-05
Payer: COMMERCIAL

## 2020-12-05 VITALS
BODY MASS INDEX: 33.93 KG/M2 | HEIGHT: 70 IN | WEIGHT: 237 LBS | TEMPERATURE: 97.5 F | RESPIRATION RATE: 18 BRPM | OXYGEN SATURATION: 100 % | SYSTOLIC BLOOD PRESSURE: 158 MMHG | DIASTOLIC BLOOD PRESSURE: 94 MMHG | HEART RATE: 82 BPM

## 2020-12-05 DIAGNOSIS — S39.012A STRAIN OF LUMBAR REGION, INITIAL ENCOUNTER: Primary | ICD-10-CM

## 2020-12-05 PROCEDURE — 99213 OFFICE O/P EST LOW 20 MIN: CPT | Performed by: PREVENTIVE MEDICINE

## 2020-12-05 RX ORDER — METHOCARBAMOL 500 MG/1
TABLET, FILM COATED ORAL
Qty: 60 TABLET | Refills: 0 | Status: SHIPPED | OUTPATIENT
Start: 2020-12-05 | End: 2021-03-31

## 2020-12-05 RX ORDER — TRAMADOL HYDROCHLORIDE 50 MG/1
TABLET ORAL
Qty: 20 TABLET | Refills: 0 | Status: SHIPPED | OUTPATIENT
Start: 2020-12-05 | End: 2021-03-31

## 2020-12-10 ENCOUNTER — OFFICE VISIT (OUTPATIENT)
Dept: URGENT CARE | Facility: CLINIC | Age: 40
End: 2020-12-10
Payer: COMMERCIAL

## 2020-12-10 VITALS
TEMPERATURE: 96.1 F | BODY MASS INDEX: 33.79 KG/M2 | OXYGEN SATURATION: 98 % | HEIGHT: 70 IN | DIASTOLIC BLOOD PRESSURE: 82 MMHG | HEART RATE: 68 BPM | WEIGHT: 236 LBS | SYSTOLIC BLOOD PRESSURE: 132 MMHG | RESPIRATION RATE: 16 BRPM

## 2020-12-10 DIAGNOSIS — K57.92 DIVERTICULITIS: Primary | ICD-10-CM

## 2020-12-10 PROCEDURE — 99213 OFFICE O/P EST LOW 20 MIN: CPT | Performed by: PHYSICIAN ASSISTANT

## 2020-12-10 RX ORDER — METRONIDAZOLE 500 MG/1
500 TABLET ORAL EVERY 8 HOURS SCHEDULED
Qty: 21 TABLET | Refills: 0 | Status: SHIPPED | OUTPATIENT
Start: 2020-12-10 | End: 2020-12-17

## 2020-12-10 RX ORDER — CIPROFLOXACIN 500 MG/1
500 TABLET, FILM COATED ORAL EVERY 12 HOURS SCHEDULED
Qty: 14 TABLET | Refills: 0 | Status: SHIPPED | OUTPATIENT
Start: 2020-12-10 | End: 2020-12-17

## 2021-03-15 ENCOUNTER — OFFICE VISIT (OUTPATIENT)
Dept: URGENT CARE | Facility: CLINIC | Age: 41
End: 2021-03-15
Payer: COMMERCIAL

## 2021-03-15 VITALS
TEMPERATURE: 97.4 F | SYSTOLIC BLOOD PRESSURE: 162 MMHG | OXYGEN SATURATION: 99 % | HEART RATE: 84 BPM | DIASTOLIC BLOOD PRESSURE: 94 MMHG | BODY MASS INDEX: 33.79 KG/M2 | RESPIRATION RATE: 15 BRPM | HEIGHT: 70 IN | WEIGHT: 236 LBS

## 2021-03-15 DIAGNOSIS — K57.92 DIVERTICULITIS: Primary | ICD-10-CM

## 2021-03-15 PROCEDURE — 99213 OFFICE O/P EST LOW 20 MIN: CPT | Performed by: PHYSICIAN ASSISTANT

## 2021-03-15 RX ORDER — METRONIDAZOLE 500 MG/1
500 TABLET ORAL EVERY 8 HOURS SCHEDULED
Qty: 21 TABLET | Refills: 0 | Status: SHIPPED | OUTPATIENT
Start: 2021-03-15 | End: 2021-03-22

## 2021-03-15 RX ORDER — MULTIVIT WITH MINERALS/LUTEIN
250 TABLET ORAL DAILY
COMMUNITY

## 2021-03-15 RX ORDER — DIPHENOXYLATE HYDROCHLORIDE AND ATROPINE SULFATE 2.5; .025 MG/1; MG/1
1 TABLET ORAL DAILY
COMMUNITY

## 2021-03-15 RX ORDER — CIPROFLOXACIN 500 MG/1
500 TABLET, FILM COATED ORAL EVERY 12 HOURS SCHEDULED
Qty: 14 TABLET | Refills: 0 | Status: SHIPPED | OUTPATIENT
Start: 2021-03-15 | End: 2021-03-22

## 2021-03-16 NOTE — PATIENT INSTRUCTIONS
Diverticulitis   WHAT YOU NEED TO KNOW:   Diverticulitis is a condition that causes small pockets along your intestine called diverticula to become inflamed or infected  This is caused by hard bowel movements, food, or bacteria that get stuck in the pockets  DISCHARGE INSTRUCTIONS:   Return to the emergency department if:   · You have bowel movement or foul-smelling discharge leaking from your vagina or in your urine  · You have severe diarrhea  · You urinate less than usual or not at all  · You are not able to have a bowel movement  · You cannot stop vomiting  · You have severe abdominal pain, a fever, and your abdomen is larger than usual      · You have new or increased blood in your bowel movements  Contact your healthcare provider if:   · You have pain when you urinate  · Your symptoms get worse or do not go away  · You have questions or concerns about your condition or care  Medicines:   · Antibiotics  may be given to help treat a bacterial infection  · Prescription pain medicine  may be given  Ask your healthcare provider how to take this medicine safely  Some prescription pain medicines contain acetaminophen  Do not take other medicines that contain acetaminophen without talking to your healthcare provider  Too much acetaminophen may cause liver damage  Prescription pain medicine may cause constipation  Ask your healthcare provider how to prevent or treat constipation  · Take your medicine as directed  Contact your healthcare provider if you think your medicine is not helping or if you have side effects  Tell him or her if you are allergic to any medicine  Keep a list of the medicines, vitamins, and herbs you take  Include the amounts, and when and why you take them  Bring the list or the pill bottles to follow-up visits  Carry your medicine list with you in case of an emergency  Clear liquid diet:  A clear liquid diet includes any liquids that you can see through  Examples include water, ginger-nikkie, cranberry or apple juice, frozen fruit ice, or broth  Stay on a clear liquid diet until your symptoms are gone, or as directed  Follow up with your healthcare provider as directed: You may need to return for a colonoscopy  When your symptoms are gone, you may need a low-fat, high-fiber diet to prevent diverticulitis from developing again  Your healthcare provider or dietitian can help you create meal plans  Write down your questions so you remember to ask them during your visits  © Copyright [address] Information is for End User's use only and may not be sold, redistributed or otherwise used for commercial purposes  All illustrations and images included in CareNotes® are the copyrighted property of [employer]  or [facility] Candido Bashir  The above information is an  only  It is not intended as medical advice for individual conditions or treatments  Talk to your doctor, nurse or pharmacist before following any medical regimen to see if it is safe and effective for you

## 2021-03-16 NOTE — PROGRESS NOTES
[address] [facility]        NAME: Jacob Mckeon is a 36 y o  male  : 1980    MRN: [id number]  DATE: 2021  TIME: 8:28 AM    Assessment and Plan   Diverticulitis [K57 92]  1  Diverticulitis  ciprofloxacin (CIPRO) 500 mg tablet    metroNIDAZOLE (FLAGYL) 500 mg tablet     Pt has some LLQ tenderness without guarding or rebound  No fever, vomiting or diarrhea  Pt has hx of same and understands that if symptoms worsen he is to go to the ER  Pt agrees  Patient Instructions   Patient Instructions   Diverticulitis   WHAT YOU NEED TO KNOW:   Diverticulitis is a condition that causes small pockets along your intestine called diverticula to become inflamed or infected  This is caused by hard bowel movements, food, or bacteria that get stuck in the pockets  DISCHARGE INSTRUCTIONS:   Return to the emergency department if:   · You have bowel movement or foul-smelling discharge leaking from your vagina or in your urine  · You have severe diarrhea  · You urinate less than usual or not at all  · You are not able to have a bowel movement  · You cannot stop vomiting  · You have severe abdominal pain, a fever, and your abdomen is larger than usual      · You have new or increased blood in your bowel movements  Contact your healthcare provider if:   · You have pain when you urinate  · Your symptoms get worse or do not go away  · You have questions or concerns about your condition or care  Medicines:   · Antibiotics  may be given to help treat a bacterial infection  · Prescription pain medicine  may be given  Ask your healthcare provider how to take this medicine safely  Some prescription pain medicines contain acetaminophen  Do not take other medicines that contain acetaminophen without talking to your healthcare provider  Too much acetaminophen may cause liver damage  Prescription pain medicine may cause constipation   Ask your healthcare provider how to prevent or treat constipation  · Take your medicine as directed  Contact your healthcare provider if you think your medicine is not helping or if you have side effects  Tell him or her if you are allergic to any medicine  Keep a list of the medicines, vitamins, and herbs you take  Include the amounts, and when and why you take them  Bring the list or the pill bottles to follow-up visits  Carry your medicine list with you in case of an emergency  Clear liquid diet:  A clear liquid diet includes any liquids that you can see through  Examples include water, ginger-nikkie, cranberry or apple juice, frozen fruit ice, or broth  Stay on a clear liquid diet until your symptoms are gone, or as directed  Follow up with your healthcare provider as directed: You may need to return for a colonoscopy  When your symptoms are gone, you may need a low-fat, high-fiber diet to prevent diverticulitis from developing again  Your healthcare provider or dietitian can help you create meal plans  Write down your questions so you remember to ask them during your visits  © Copyright [address] Information is for End User's use only and may not be sold, redistributed or otherwise used for commercial purposes  All illustrations and images included in CareNotes® are the copyrighted property of [employer]  or [address] [place] [address]  The above information is an  only  It is not intended as medical advice for individual conditions or treatments  Talk to your doctor, nurse or pharmacist before following any medical regimen to see if it is safe and effective for you  Proceed to  ER if symptoms worsen  Chief Complaint     Chief Complaint   Patient presents with    Abdominal Pain     Pt C/O pain in his lower left abdomen for approx 4 days  Pain has increased every day  Pt not able to eat or drink  Denies nausea or vomiting  Denies fever            History of Present Illness        70-year-old male with history of diverticulitis presents for evaluation of left lower quadrant pain for the past 4 days  He states his last flare was in December and was treated with antibiotics as an outpatient  He denies any history of abdominal surgery in the past   His last colonoscopy was in April 2019  He states that his flares prior have been infrequent, yearly or twice a year  He denies any fever, nausea, vomiting, diarrhea  He states that today he has maintained a liquid diet  Review of Systems   Review of Systems   Constitutional: Negative for chills and fever  Respiratory: Negative  Cardiovascular: Negative  Gastrointestinal: Positive for abdominal pain  Negative for blood in stool, constipation, nausea and vomiting  Genitourinary: Negative for hematuria  Skin: Negative  Current Medications       Current Outpatient Medications:     ascorbic acid (VITAMIN C) 250 mg tablet, Take 250 mg by mouth daily, Disp: , Rfl:     multivitamin (THERAGRAN) TABS, Take 1 tablet by mouth daily, Disp: , Rfl:     ciprofloxacin (CIPRO) 500 mg tablet, Take 1 tablet (500 mg total) by mouth every 12 (twelve) hours for 7 days, Disp: 14 tablet, Rfl: 0    methocarbamol (ROBAXIN) 500 mg tablet, Two tablets 4 times a day (Patient not taking: Reported on 3/15/2021), Disp: 60 tablet, Rfl: 0    metroNIDAZOLE (FLAGYL) 500 mg tablet, Take 1 tablet (500 mg total) by mouth every 8 (eight) hours for 7 days, Disp: 21 tablet, Rfl: 0    traMADol (ULTRAM) 50 mg tablet, Use 1 or 2 tablets every 6 hours as needed for severe pain   (Patient not taking: Reported on 12/10/2020), Disp: 20 tablet, Rfl: 0    Current Allergies     Allergies as of 03/15/2021    (No Known Allergies)            The following portions of the patient's history were reviewed and updated as appropriate: allergies, current medications, past family history, past medical history, past social history, past surgical history and problem list      Past Medical History:   Diagnosis Date    Diverticulitis large intestine        Past Surgical History:   Procedure Laterality Date    COLONOSCOPY      VASECTOMY  01/19/2016    Vas Deferens       Family History   Problem Relation Age of Onset    Hodgkin's lymphoma Father          Medications have been verified  Objective   /94   Pulse 84   Temp (!) 97 4 °F (36 3 °C)   Resp 15   Ht 5' 10" (1 778 m)   Wt 107 kg (236 lb)   SpO2 99%   BMI 33 86 kg/m²        Physical Exam     Physical Exam  Vitals signs reviewed  Constitutional:       General: He is not in acute distress  Cardiovascular:      Rate and Rhythm: Normal rate and regular rhythm  Pulmonary:      Effort: Pulmonary effort is normal       Breath sounds: Normal breath sounds  Abdominal:      General: Bowel sounds are normal       Palpations: Abdomen is soft  Tenderness: There is abdominal tenderness in the left lower quadrant  There is no guarding or rebound  Skin:     General: Skin is warm and dry  Neurological:      Mental Status: He is alert and oriented to person, place, and time

## 2021-03-30 DIAGNOSIS — Z23 ENCOUNTER FOR IMMUNIZATION: ICD-10-CM

## 2021-03-31 ENCOUNTER — OFFICE VISIT (OUTPATIENT)
Dept: FAMILY MEDICINE CLINIC | Facility: CLINIC | Age: 41
End: 2021-03-31
Payer: COMMERCIAL

## 2021-03-31 VITALS
HEIGHT: 70 IN | RESPIRATION RATE: 17 BRPM | OXYGEN SATURATION: 99 % | DIASTOLIC BLOOD PRESSURE: 92 MMHG | BODY MASS INDEX: 33.64 KG/M2 | HEART RATE: 82 BPM | TEMPERATURE: 98 F | SYSTOLIC BLOOD PRESSURE: 138 MMHG | WEIGHT: 235 LBS

## 2021-03-31 DIAGNOSIS — K57.90 DIVERTICULAR DISEASE: ICD-10-CM

## 2021-03-31 DIAGNOSIS — Z00.00 ENCOUNTER FOR ANNUAL HEALTH EXAMINATION: Primary | ICD-10-CM

## 2021-03-31 DIAGNOSIS — Z13.220 NEED FOR LIPID SCREENING: ICD-10-CM

## 2021-03-31 DIAGNOSIS — F43.22 ADJUSTMENT DISORDER WITH ANXIETY: ICD-10-CM

## 2021-03-31 PROCEDURE — 99396 PREV VISIT EST AGE 40-64: CPT | Performed by: FAMILY MEDICINE

## 2021-03-31 RX ORDER — DICYCLOMINE HCL 20 MG
20 TABLET ORAL 3 TIMES DAILY PRN
Qty: 30 TABLET | Refills: 1 | Status: SHIPPED | OUTPATIENT
Start: 2021-03-31

## 2021-03-31 NOTE — PROGRESS NOTES
FAMILY PRACTICE OFFICE VISIT       NAME: Coreen Gresham  AGE: 36 y o  SEX: male       : 1980        MRN: [id number]        Assessment and Plan     Problem List Items Addressed This Visit        Digestive    Diverticular disease    Relevant Medications    dicyclomine (BENTYL) 20 mg tablet    Other Relevant Orders    CBC    Comprehensive metabolic panel    TSH, 3rd generation    CT abdomen pelvis w contrast      Other Visit Diagnoses     Encounter for annual health examination    -  Primary    Adjustment disorder with anxiety        Relevant Medications    sertraline (ZOLOFT) 50 mg tablet    Need for lipid screening        Relevant Orders    Lipid Panel with Direct LDL reflex      Patient presents for annual well exam     He was recently treated by urgent care center for acute diverticulitis symptoms in December and March  Patient reports that symptoms have improved with antibiotic therapy  Patient is concerned about rather persistent left lower quadrant abdominal discomfort that has been present within past few months and does not resemble typical symptoms of diverticulitis  Patient reports symptoms of abdominal bloating and gurgling  His bowel movements are regular but some was changed in caliber in size  Patient denies symptoms of melena or bright red blood per rectum  Exam reveals mild left lower quadrant abdominal tenderness with no rebound or guarding  Patient admits to normal appetite  He remains afebrile  Patient reports worsening of anxiety symptoms within past few months, work related stress  He has started counseling but would like to try medication for his symptoms  Patient denies symptoms of depression  He believes that work related stress is the primary trigger        Plan  ·  routine blood work orders as outlined  ·  proceed with CT abdomen and pelvis due to recurrent left lower quadrant abdominal discomfort in this patient with recurrent diverticulitis  ·  IBS should be considered as possible etiology of patient's symptoms, Trial of Bentyl as needed  ·  start Zoloft 25 mg daily for 6 days then increase dose to 50 mg daily  I counseled patient on mechanism of action and possible adverse side effects of SSRIs  Patient is well aware that medication may take 4-6 weeks to become effective  Continue counseling  Patient will contact me with any questions or concerns in the interim  ·   Mild blood pressure elevation  Possibly due to weight gain, stress, anxiety  We will hold off BP meds for now and will reassess at next office visit in 6 weeks  BMI Counseling: Body mass index is 33 72 kg/m²  The BMI is above normal  Nutrition recommendations include encouraging healthy choices of fruits and vegetables, consuming healthier snacks, moderation in carbohydrate intake and reducing intake of cholesterol  Exercise recommendations include exercising 3-5 times per week  There are no Patient Instructions on file for this visit  Discussed with the patient and all questioned fully answered  He will call me if any problems arise  M*Modal software was used to dictate this note  It may contain errors with dictating incorrect words/spelling  Please contact provider directly with any questions  Chief Complaint     Chief Complaint   Patient presents with    Annual Exam       History of Present Illness     Patient presents for annual well exam     No daily prescription medications  He was evaluated urgent care center on March 15, 2021 for another diverticulitis flare  Previous treatment for episode of diverticulitis was December of 2020 by urgent care as well  Patient was treated with Cipro and Flagyl,  He has noticed significant improvement of symptoms after few days of antibiotic therapy  No CT scan  Most recent evaluation by colorectal surgery May 2019, Dr Sebastián Paez, colonoscopy revealed diverticulosis of entire colon, mild in severity      Patient with history of recurrent episodes of diverticulitis  Patient reports overall chronic left lower quadrant discomfort within past few months  He reports that bowel movements have been Center and possibly narrowing and size  Bowel movements are regular, no diarrhea constipation  Patient denies symptoms of bright red blood per rectum or melena  Left-sided pain is worse in the morning  Patient denies any strong family history of diverticular disease but his mom other has been treated for colitis  Patient feels bloated at times, he has noticed occasional symptoms of gurgling in his stomach  Patient is concerned regarding recent weight gain  He is hopeful to start exercising and follow healthy diet  He has been under care of therapist since mid January due to symptoms of irritability, anxiety, primarily work stress related  Patient denies symptoms of depression per se  Patient is concerned that ongoing stressful situation at work is affecting his mood  He is interested in trial of medication to improve symptoms of anxiety  Patient has received 1st COVID-19 vaccine in tolerated while  Review of Systems   Review of Systems   Constitutional: Negative  HENT: Negative  Eyes: Negative  Respiratory: Negative  Cardiovascular: Negative  Gastrointestinal: Positive for abdominal pain  Endocrine: Negative  Genitourinary: Negative  Musculoskeletal: Negative  Skin: Negative  Allergic/Immunologic: Negative  Neurological: Negative  Hematological: Negative  Psychiatric/Behavioral: The patient is nervous/anxious          Active Problem List     Patient Active Problem List   Diagnosis    Sinusitis    Diverticular disease       Past Medical History:  Past Medical History:   Diagnosis Date    Diverticulitis large intestine        Past Surgical History:  Past Surgical History:   Procedure Laterality Date    COLONOSCOPY      VASECTOMY  01/19/2016    Vas Deferens Family History:  Family History   Problem Relation Age of Onset    Hodgkin's lymphoma Father        Social History:  Social History     Socioeconomic History    Marital status: /Civil Union     Spouse name: Not on file    Number of children: Not on file    Years of education: Not on file    Highest education level: Not on file   Occupational History    Not on file   Social Needs    Financial resource strain: Not on file    Food insecurity     Worry: Not on file     Inability: Not on file   [employer] needs     Medical: Not on file     Non-medical: Not on file   Tobacco Use    Smoking status: Never Smoker    Smokeless tobacco: Never Used   Substance and Sexual Activity    Alcohol use: Not Currently     Comment: occasional     Drug use: No    Sexual activity: Not on file   Lifestyle    Physical activity     Days per week: Not on file     Minutes per session: Not on file    Stress: Not on file   Relationships    Social connections     Talks on phone: Not on file     Gets together: Not on file     Attends [employer] service: Not on file     Active member of club or organization: Not on file     Attends meetings of clubs or organizations: Not on file     Relationship status: Not on file    Intimate partner violence     Fear of current or ex partner: Not on file     Emotionally abused: Not on file     Physically abused: Not on file     Forced sexual activity: Not on file   Other Topics Concern    Not on file   Social History Narrative    Not on file           Objective     Vitals:    03/31/21 0824 03/31/21 0906   BP: 142/92 138/92   BP Location: Left arm Left arm   Patient Position: Sitting    Cuff Size: Large    Pulse: 82    Resp: 17    Temp: 98 °F (36 7 °C)    TempSrc: Temporal    SpO2: 99%    Weight: 107 kg (235 lb)    Height: 5' 10" (1 778 m)      Wt Readings from Last 3 Encounters:   03/31/21 107 kg (235 lb)   03/15/21 107 kg (236 lb)   12/10/20 107 kg (236 lb)       Physical Exam  Vitals signs and nursing note reviewed  Constitutional:       General: He is not in acute distress  Appearance: Normal appearance  He is well-developed  He is not ill-appearing  HENT:      Head: Normocephalic and atraumatic  Eyes:      Conjunctiva/sclera: Conjunctivae normal    Neck:      Musculoskeletal: Neck supple  Vascular: No carotid bruit  Cardiovascular:      Rate and Rhythm: Normal rate and regular rhythm  Heart sounds: Normal heart sounds  No murmur  Pulmonary:      Effort: Pulmonary effort is normal  No respiratory distress  Breath sounds: Normal breath sounds  No wheezing or rales  Abdominal:      General: Bowel sounds are increased  There is no distension or abdominal bruit  Palpations: Abdomen is soft  Tenderness: There is abdominal tenderness in the left lower quadrant  There is no right CVA tenderness, left CVA tenderness, guarding or rebound  Musculoskeletal: Normal range of motion  Right lower leg: No edema  Left lower leg: No edema  Neurological:      General: No focal deficit present  Mental Status: He is alert and oriented to person, place, and time  Cranial Nerves: No cranial nerve deficit  Psychiatric:         Mood and Affect: Mood normal          Behavior: Behavior normal          Thought Content:  Thought content normal          Pertinent Laboratory/Diagnostic Studies:  Lab Results   Component Value Date    GLUCOSE 108 02/21/2014    BUN 21 02/21/2014    CREATININE 0 94 02/21/2014    CALCIUM 8 2 (L) 02/21/2014     02/21/2014    K 4 1 02/21/2014    CO2 31 02/21/2014     02/21/2014     Lab Results   Component Value Date    ALT 30 02/21/2014    AST 18 02/21/2014    ALKPHOS 56 02/21/2014    BILITOT 1 4 (H) 02/21/2014       Lab Results   Component Value Date    WBC 10 28 (H) 02/21/2014    HGB 15 6 02/21/2014    HCT 44 0 02/21/2014    MCV 86 02/21/2014     02/21/2014       No results found for: TSH    No results found for: CHOL  No results found for: TRIG  No results found for: HDL  No results found for: LDLCALC  No results found for: HGBA1C    Results for orders placed or performed in visit on 03/21/16   Semen analysis, post-vasectomy   Result Value Ref Range    Volume 5 4 (H) 1 0 - 5 0 mL    Viscosity 4 3 - 4    WBC's 0 <1 hpf    pH, Fluid 8 5 7 2 - 8 6    Specimen Collection Home     Post Vas Comment Azoospermia  Specimen Pelleted        Orders Placed This Encounter   Procedures    CT abdomen pelvis w contrast    CBC    Comprehensive metabolic panel    Lipid Panel with Direct LDL reflex    TSH, 3rd generation       ALLERGIES:  No Known Allergies    Current Medications     Current Outpatient Medications   Medication Sig Dispense Refill    ascorbic acid (VITAMIN C) 250 mg tablet Take 250 mg by mouth daily      multivitamin (THERAGRAN) TABS Take 1 tablet by mouth daily      dicyclomine (BENTYL) 20 mg tablet Take 1 tablet (20 mg total) by mouth 3 (three) times a day as needed (abdominal bloating) 30 tablet 1    sertraline (ZOLOFT) 50 mg tablet Take 1/2  tablet once a day for 6 days then take 1 tablet daily 30 tablet 1     No current facility-administered medications for this visit          Medications Discontinued During This Encounter   Medication Reason    traMADol (ULTRAM) 50 mg tablet     methocarbamol (ROBAXIN) 500 mg tablet        Health Maintenance     Health Maintenance   Topic Date Due    HIV Screening  Never done    BMI: Followup Plan  03/09/2021    Annual Physical  03/18/2021    COVID-19 Vaccine (2 - Pfizer 2-dose series) 04/23/2021    Depression Screening PHQ  12/10/2021    BMI: Adult  03/31/2022    DTaP,Tdap,and Td Vaccines (3 - Td) 09/05/2030    Influenza Vaccine  Completed    Pneumococcal Vaccine: Pediatrics (0 to 5 Years) and At-Risk Patients (6 to 59 Years)  Aged Out    HIB Vaccine  Aged Out    Hepatitis B Vaccine  Aged Out    IPV Vaccine  Aged Out    Hepatitis A Vaccine  Aged Out    Meningococcal ACWY Vaccine  Aged Out    HPV Vaccine  Aged Out       Immunization History   Administered Date(s) Administered    INFLUENZA 10/25/2019, 01/11/2021    Influenza Quadrivalent Preservative Free 3 years and older IM 11/08/2014, 11/12/2015    SARS-CoV-2 / COVID-19 mRNA IM (Pfizer-BioNTech) 04/02/2021    Tdap 03/20/2019, 09/05/2020    Tuberculin Skin Test-PPD Intradermal 11/10/2015    influenza, injectable, quadrivalent 10/20/2018       Tram Beltrán MD

## 2021-04-02 ENCOUNTER — IMMUNIZATIONS (OUTPATIENT)
Dept: FAMILY MEDICINE CLINIC | Facility: HOSPITAL | Age: 41
End: 2021-04-02

## 2021-04-02 DIAGNOSIS — Z23 ENCOUNTER FOR IMMUNIZATION: Primary | ICD-10-CM

## 2021-04-02 PROCEDURE — 91300 SARS-COV-2 / COVID-19 MRNA VACCINE (PFIZER-BIONTECH) 30 MCG: CPT

## 2021-04-02 PROCEDURE — 0001A SARS-COV-2 / COVID-19 MRNA VACCINE (PFIZER-BIONTECH) 30 MCG: CPT

## 2021-04-09 ENCOUNTER — APPOINTMENT (OUTPATIENT)
Dept: LAB | Facility: CLINIC | Age: 41
End: 2021-04-09
Payer: COMMERCIAL

## 2021-04-09 DIAGNOSIS — K57.90 DIVERTICULAR DISEASE: ICD-10-CM

## 2021-04-09 DIAGNOSIS — Z13.220 NEED FOR LIPID SCREENING: ICD-10-CM

## 2021-04-09 LAB
ALBUMIN SERPL BCP-MCNC: 4.2 G/DL (ref 3.5–5)
ALP SERPL-CCNC: 46 U/L (ref 46–116)
ALT SERPL W P-5'-P-CCNC: 56 U/L (ref 12–78)
ANION GAP SERPL CALCULATED.3IONS-SCNC: 4 MMOL/L (ref 4–13)
AST SERPL W P-5'-P-CCNC: 30 U/L (ref 5–45)
BILIRUB SERPL-MCNC: 2.71 MG/DL (ref 0.2–1)
BUN SERPL-MCNC: 16 MG/DL (ref 5–25)
CALCIUM SERPL-MCNC: 8.3 MG/DL (ref 8.3–10.1)
CHLORIDE SERPL-SCNC: 106 MMOL/L (ref 100–108)
CHOLEST SERPL-MCNC: 195 MG/DL (ref 50–200)
CO2 SERPL-SCNC: 30 MMOL/L (ref 21–32)
CREAT SERPL-MCNC: 0.99 MG/DL (ref 0.6–1.3)
ERYTHROCYTE [DISTWIDTH] IN BLOOD BY AUTOMATED COUNT: 13.3 % (ref 11.6–15.1)
GFR SERPL CREATININE-BSD FRML MDRD: 95 ML/MIN/1.73SQ M
GLUCOSE P FAST SERPL-MCNC: 99 MG/DL (ref 65–99)
HCT VFR BLD AUTO: 45 % (ref 36.5–49.3)
HDLC SERPL-MCNC: 46 MG/DL
HGB BLD-MCNC: 15.3 G/DL (ref 12–17)
LDLC SERPL CALC-MCNC: 133 MG/DL (ref 0–100)
MCH RBC QN AUTO: 30.7 PG (ref 26.8–34.3)
MCHC RBC AUTO-ENTMCNC: 34 G/DL (ref 31.4–37.4)
MCV RBC AUTO: 90 FL (ref 82–98)
PLATELET # BLD AUTO: 153 THOUSANDS/UL (ref 149–390)
PMV BLD AUTO: 10.2 FL (ref 8.9–12.7)
POTASSIUM SERPL-SCNC: 3.8 MMOL/L (ref 3.5–5.3)
PROT SERPL-MCNC: 7.1 G/DL (ref 6.4–8.2)
RBC # BLD AUTO: 4.98 MILLION/UL (ref 3.88–5.62)
SODIUM SERPL-SCNC: 140 MMOL/L (ref 136–145)
TRIGL SERPL-MCNC: 80 MG/DL
TSH SERPL DL<=0.05 MIU/L-ACNC: 2.41 UIU/ML (ref 0.36–3.74)
WBC # BLD AUTO: 5.49 THOUSAND/UL (ref 4.31–10.16)

## 2021-04-09 PROCEDURE — 36415 COLL VENOUS BLD VENIPUNCTURE: CPT

## 2021-04-09 PROCEDURE — 84443 ASSAY THYROID STIM HORMONE: CPT

## 2021-04-09 PROCEDURE — 80061 LIPID PANEL: CPT

## 2021-04-09 PROCEDURE — 85027 COMPLETE CBC AUTOMATED: CPT

## 2021-04-09 PROCEDURE — 80053 COMPREHEN METABOLIC PANEL: CPT

## 2021-04-14 ENCOUNTER — HOSPITAL ENCOUNTER (OUTPATIENT)
Dept: RADIOLOGY | Facility: HOSPITAL | Age: 41
Discharge: HOME | End: 2021-04-14
Payer: COMMERCIAL

## 2021-04-14 DIAGNOSIS — K57.90 DIVERTICULAR DISEASE: ICD-10-CM

## 2021-04-14 PROCEDURE — G1004 CDSM NDSC: HCPCS

## 2021-04-14 PROCEDURE — 74177 CT ABD & PELVIS W/CONTRAST: CPT

## 2021-04-14 RX ADMIN — IOHEXOL 100 ML: 350 INJECTION, SOLUTION INTRAVENOUS at 08:04

## 2021-04-19 ENCOUNTER — TELEPHONE (OUTPATIENT)
Dept: FAMILY MEDICINE CLINIC | Facility: CLINIC | Age: 41
End: 2021-04-19

## 2021-04-19 NOTE — TELEPHONE ENCOUNTER
----- Message from Blanca Kumar MD sent at 4/19/2021  1:45 PM EDT -----  Please contact patient  CT of abdomen and pelvis reveals diverticulosis but no evidence of diverticulitis  It is essentially normal study  No findings on CT to explain his pain which is most likely related to IBS symptoms  We will discuss further at his follow-up office visit in May      Thank you

## 2021-04-23 ENCOUNTER — IMMUNIZATIONS (OUTPATIENT)
Dept: FAMILY MEDICINE CLINIC | Facility: HOSPITAL | Age: 41
End: 2021-04-23

## 2021-04-23 DIAGNOSIS — Z23 ENCOUNTER FOR IMMUNIZATION: Primary | ICD-10-CM

## 2021-04-23 PROCEDURE — 91300 SARS-COV-2 / COVID-19 MRNA VACCINE (PFIZER-BIONTECH) 30 MCG: CPT

## 2021-04-23 PROCEDURE — 0002A SARS-COV-2 / COVID-19 MRNA VACCINE (PFIZER-BIONTECH) 30 MCG: CPT

## 2021-05-12 ENCOUNTER — OFFICE VISIT (OUTPATIENT)
Dept: FAMILY MEDICINE CLINIC | Facility: CLINIC | Age: 41
End: 2021-05-12
Payer: COMMERCIAL

## 2021-05-12 VITALS
DIASTOLIC BLOOD PRESSURE: 88 MMHG | HEART RATE: 69 BPM | HEIGHT: 70 IN | OXYGEN SATURATION: 98 % | SYSTOLIC BLOOD PRESSURE: 144 MMHG | RESPIRATION RATE: 16 BRPM | TEMPERATURE: 97.5 F | WEIGHT: 240 LBS | BODY MASS INDEX: 34.36 KG/M2

## 2021-05-12 DIAGNOSIS — E80.4 GILBERT SYNDROME: ICD-10-CM

## 2021-05-12 DIAGNOSIS — I10 ESSENTIAL HYPERTENSION: Primary | ICD-10-CM

## 2021-05-12 DIAGNOSIS — F43.23 PERSISTENT ADJUSTMENT DISORDER WITH MIXED ANXIETY AND DEPRESSED MOOD: ICD-10-CM

## 2021-05-12 DIAGNOSIS — K57.90 DIVERTICULAR DISEASE: ICD-10-CM

## 2021-05-12 PROCEDURE — 3008F BODY MASS INDEX DOCD: CPT | Performed by: FAMILY MEDICINE

## 2021-05-12 PROCEDURE — 99214 OFFICE O/P EST MOD 30 MIN: CPT | Performed by: FAMILY MEDICINE

## 2021-05-12 PROCEDURE — 1036F TOBACCO NON-USER: CPT | Performed by: FAMILY MEDICINE

## 2021-05-12 RX ORDER — OLMESARTAN MEDOXOMIL 20 MG/1
20 TABLET ORAL DAILY
Qty: 30 TABLET | Refills: 3 | Status: SHIPPED | OUTPATIENT
Start: 2021-05-12 | End: 2021-08-25 | Stop reason: SDUPTHER

## 2021-05-12 RX ORDER — SERTRALINE HYDROCHLORIDE 100 MG/1
TABLET, FILM COATED ORAL
Qty: 30 TABLET | Refills: 3 | Status: SHIPPED | OUTPATIENT
Start: 2021-05-12 | End: 2021-08-25 | Stop reason: SDUPTHER

## 2021-05-12 NOTE — PATIENT INSTRUCTIONS
· Please increase dose of sertraline to 75 mg daily for 7-10 days and then increase dose to 100 mg once a day    · Please update me with your progress via my chart a phone call in 4-8 weeks  · Please start checking blood pressures few times a week, please send me updates in a few weeks

## 2021-05-12 NOTE — PROGRESS NOTES
FAMILY PRACTICE OFFICE VISIT       NAME: Moni Matthews  AGE: 36 y o  SEX: male       : 1980        MRN: [id number]        Assessment and Plan     Problem List Items Addressed This Visit        Digestive    Diverticular disease     ·  Recent CT abdomen and pelvis is unremarkable  · Patient reports improvement of intermittent abdominal cramping and left lower quadrant pain with trial of p r n  Bentyl            Cardiovascular and Mediastinum    Essential hypertension - Primary     ·  Blood pressure remains  elevated  · Start Benicar 20 mg daily  · Patient will monitor blood pressures at home and will update me via MyChart or phone call in a few weeks  · Goal for BP below 130/80  · Follow-up 3 months         Relevant Medications    olmesartan (BENICAR) 20 mg tablet       Other    Gilbert syndrome     · Bilirubin 2 71, normal LFTs, normal liver/ gallbladder appearance on recent CT 2021  · Patient is asymptomatic  ·  No further workup is warranted         Persistent adjustment disorder with mixed anxiety and depressed mood     ·  Patient reports partial improvement of symptoms after start of Zoloft therapy, no adverse side effects  He will increase dose to 75 mg daily for the next 7-10 days and then will start Zoloft 100 mg once a day  ·  Patient will contact me with any questions or concerns regarding medication at any time         Relevant Medications    sertraline (ZOLOFT) 100 mg tablet          Patient Instructions   · Please increase dose of sertraline to 75 mg daily for 7-10 days and then increase dose to 100 mg once a day  · Please update me with your progress via my chart a phone call in 4-8 weeks  · Please start checking blood pressures few times a week, please send me updates in a few weeks      Discussed with the patient and all questioned fully answered  He will call me if any problems arise  M*Modal software was used to dictate this note   It may contain errors with dictating incorrect words/spelling  Please contact provider directly with any questions  Chief Complaint     Chief Complaint   Patient presents with    Follow-up       History of Present Illness     Patient presents for follow-up  He reports overall improvement of his symptoms after start of sertraline therapy  He is on 50 mg once a day with no adverse side effects  Patient reports that symptoms of anxiety, irritability and dysphoria have improved  He is still admitted intermittent symptoms especially by the end of the day  Sleep is normal   No sexual side effects  Abdominal pain has improved with trial of Bentyl  Recent CT abdomen and pelvis results reviewed with patient, normal     Patient denies any dyspepsia, nausea vomiting, diarrhea constipation  Patient has been following healthy diet  Review of Systems   Review of Systems   Constitutional: Negative  HENT: Negative  Eyes: Negative  Respiratory: Negative  Cardiovascular: Negative  Gastrointestinal: Negative  Endocrine: Negative  Genitourinary: Negative  Musculoskeletal: Negative  Skin: Negative  Allergic/Immunologic: Negative  Neurological: Negative  Hematological: Negative  Psychiatric/Behavioral: Positive for dysphoric mood  The patient is nervous/anxious          Active Problem List     Patient Active Problem List   Diagnosis    Sinusitis    Diverticular disease    Essential hypertension    Gilbert syndrome    Persistent adjustment disorder with mixed anxiety and depressed mood       Past Medical History:  Past Medical History:   Diagnosis Date    Diverticulitis large intestine        Past Surgical History:  Past Surgical History:   Procedure Laterality Date    COLONOSCOPY      VASECTOMY  01/19/2016    Vas Deferens       Family History:  Family History   Problem Relation Age of Onset    Hodgkin's lymphoma Father        Social History:  Social History     Socioeconomic History    Marital status: /Civil Union     Spouse name: Not on file    Number of children: Not on file    Years of education: Not on file    Highest education level: Not on file   Occupational History    Not on file   Social Needs    Financial resource strain: Not on file    Food insecurity     Worry: Not on file     Inability: Not on file    Transportation needs     Medical: Not on file     Non-medical: Not on file   Tobacco Use    Smoking status: Never Smoker    Smokeless tobacco: Never Used   Substance and Sexual Activity    Alcohol use: Not Currently     Comment: occasional     Drug use: No    Sexual activity: Not on file   Lifestyle    Physical activity     Days per week: Not on file     Minutes per session: Not on file    Stress: Not on file   Relationships    Social connections     Talks on phone: Not on file     Gets together: Not on file     Attends [employer] service: Not on file     Active member of club or organization: Not on file     Attends meetings of clubs or organizations: Not on file     Relationship status: Not on file    Intimate partner violence     Fear of current or ex partner: Not on file     Emotionally abused: Not on file     Physically abused: Not on file     Forced sexual activity: Not on file   Other Topics Concern    Not on file   Social History Narrative    Not on file           Objective     Vitals:    05/12/21 0750 05/12/21 0831   BP: 162/90 144/88   BP Location: Left arm    Patient Position: Sitting    Cuff Size: Adult    Pulse: 69    Resp: 16    Temp: 97 5 °F (36 4 °C)    TempSrc: Temporal    SpO2: 98%    Weight: 109 kg (240 lb)    Height: 5' 10" (1 778 m)      Wt Readings from Last 3 Encounters:   05/12/21 109 kg (240 lb)   03/31/21 107 kg (235 lb)   03/15/21 107 kg (236 lb)       Physical Exam  Vitals signs and nursing note reviewed  Constitutional:       General: He is not in acute distress  Appearance: Normal appearance  He is well-developed   He is not ill-appearing  HENT:      Head: Normocephalic and atraumatic  Eyes:      Conjunctiva/sclera: Conjunctivae normal    Neck:      Musculoskeletal: Neck supple  Vascular: No carotid bruit  Cardiovascular:      Rate and Rhythm: Normal rate and regular rhythm  Heart sounds: Normal heart sounds  No murmur  Pulmonary:      Effort: Pulmonary effort is normal  No respiratory distress  Breath sounds: Normal breath sounds  No wheezing or rales  Abdominal:      General: Bowel sounds are normal  There is no distension or abdominal bruit  Palpations: Abdomen is soft  Musculoskeletal: Normal range of motion  Right lower leg: No edema  Left lower leg: No edema  Skin:     General: Skin is warm  Neurological:      General: No focal deficit present  Mental Status: He is alert and oriented to person, place, and time  Cranial Nerves: No cranial nerve deficit  Psychiatric:         Mood and Affect: Mood normal          Behavior: Behavior normal          Thought Content:  Thought content normal          Pertinent Laboratory/Diagnostic Studies:  Lab Results   Component Value Date    GLUCOSE 108 02/21/2014    BUN 16 04/09/2021    CREATININE 0 99 04/09/2021    CALCIUM 8 3 04/09/2021     02/21/2014    K 3 8 04/09/2021    CO2 30 04/09/2021     04/09/2021     Lab Results   Component Value Date    ALT 56 04/09/2021    AST 30 04/09/2021    ALKPHOS 46 04/09/2021    BILITOT 1 4 (H) 02/21/2014       Lab Results   Component Value Date    WBC 5 49 04/09/2021    HGB 15 3 04/09/2021    HCT 45 0 04/09/2021    MCV 90 04/09/2021     04/09/2021       No results found for: TSH    No results found for: CHOL  Lab Results   Component Value Date    TRIG 80 04/09/2021     Lab Results   Component Value Date    HDL 46 04/09/2021     Lab Results   Component Value Date    LDLCALC 133 (H) 04/09/2021     No results found for: HGBA1C    Results for orders placed or performed in visit on 04/09/21   CBC   Result Value Ref Range    WBC 5 49 4 31 - 10 16 Thousand/uL    RBC 4 98 3 88 - 5 62 Million/uL    Hemoglobin 15 3 12 0 - 17 0 g/dL    Hematocrit 45 0 36 5 - 49 3 %    MCV 90 82 - 98 fL    MCH 30 7 26 8 - 34 3 pg    MCHC 34 0 31 4 - 37 4 g/dL    RDW 13 3 11 6 - 15 1 %    Platelets 754 733 - 705 Thousands/uL    MPV 10 2 8 9 - 12 7 fL   Comprehensive metabolic panel   Result Value Ref Range    Sodium 140 136 - 145 mmol/L    Potassium 3 8 3 5 - 5 3 mmol/L    Chloride 106 100 - 108 mmol/L    CO2 30 21 - 32 mmol/L    ANION GAP 4 4 - 13 mmol/L    BUN 16 5 - 25 mg/dL    Creatinine 0 99 0 60 - 1 30 mg/dL    Glucose, Fasting 99 65 - 99 mg/dL    Calcium 8 3 8 3 - 10 1 mg/dL    AST 30 5 - 45 U/L    ALT 56 12 - 78 U/L    Alkaline Phosphatase 46 46 - 116 U/L    Total Protein 7 1 6 4 - 8 2 g/dL    Albumin 4 2 3 5 - 5 0 g/dL    Total Bilirubin 2 71 (H) 0 20 - 1 00 mg/dL    eGFR 95 ml/min/1 73sq m   Lipid Panel with Direct LDL reflex   Result Value Ref Range    Cholesterol 195 50 - 200 mg/dL    Triglycerides 80 <=150 mg/dL    HDL, Direct 46 >=40 mg/dL    LDL Calculated 133 (H) 0 - 100 mg/dL   TSH, 3rd generation   Result Value Ref Range    TSH 3RD GENERATON 2 410 0 358 - 3 740 uIU/mL       No orders of the defined types were placed in this encounter  ALLERGIES:  No Known Allergies    Current Medications     Current Outpatient Medications   Medication Sig Dispense Refill    ascorbic acid (VITAMIN C) 250 mg tablet Take 250 mg by mouth daily      dicyclomine (BENTYL) 20 mg tablet Take 1 tablet (20 mg total) by mouth 3 (three) times a day as needed (abdominal bloating) 30 tablet 1    multivitamin (THERAGRAN) TABS Take 1 tablet by mouth daily      sertraline (ZOLOFT) 100 mg tablet Take 1/2  tablet once a day for 6 days then take 1 tablet daily 30 tablet 3    olmesartan (BENICAR) 20 mg tablet Take 1 tablet (20 mg total) by mouth daily 30 tablet 3     No current facility-administered medications for this visit  Medications Discontinued During This Encounter   Medication Reason    sertraline (ZOLOFT) 50 mg tablet Reorder       Health Maintenance     Health Maintenance   Topic Date Due    HIV Screening  Never done    Depression Screening PHQ  12/10/2021    BMI: Followup Plan  03/31/2022    Annual Physical  03/31/2022    BMI: Adult  05/12/2022    DTaP,Tdap,and Td Vaccines (3 - Td) 09/05/2030    Influenza Vaccine  Completed    COVID-19 Vaccine  Completed    Pneumococcal Vaccine: Pediatrics (0 to 5 Years) and At-Risk Patients (6 to 59 Years)  Aged Out    HIB Vaccine  Aged Out    Hepatitis B Vaccine  Aged Out    IPV Vaccine  Aged Out    Hepatitis A Vaccine  Aged Out    Meningococcal ACWY Vaccine  Aged Out    HPV Vaccine  Aged [employer] History   Administered Date(s) Administered    INFLUENZA 10/25/2019, 01/11/2021    Influenza Quadrivalent Preservative Free 3 years and older IM 11/08/2014, 11/12/2015    SARS-CoV-2 / COVID-19 mRNA IM ([employer]) 04/02/2021, 04/23/2021    Tdap 03/20/2019, 09/05/2020    Tuberculin Skin Test-PPD Intradermal 11/10/2015    influenza, injectable, quadrivalent 10/20/2018       Nba Petit MD

## 2021-05-15 PROBLEM — F43.23 PERSISTENT ADJUSTMENT DISORDER WITH MIXED ANXIETY AND DEPRESSED MOOD: Status: ACTIVE | Noted: 2021-05-15

## 2021-05-15 NOTE — ASSESSMENT & PLAN NOTE
·  Recent CT abdomen and pelvis is unremarkable  · Patient reports improvement of intermittent abdominal cramping and left lower quadrant pain with trial of p r n   Bentyl

## 2021-05-15 NOTE — ASSESSMENT & PLAN NOTE
· Bilirubin 2 71, normal LFTs, normal liver/ gallbladder appearance on recent CT April 2021  · Patient is asymptomatic      ·  No further workup is warranted

## 2021-05-15 NOTE — ASSESSMENT & PLAN NOTE
·  Patient reports partial improvement of symptoms after start of Zoloft therapy, no adverse side effects  He will increase dose to 75 mg daily for the next 7-10 days and then will start Zoloft 100 mg once a day     ·  Patient will contact me with any questions or concerns regarding medication at any time

## 2021-05-15 NOTE — ASSESSMENT & PLAN NOTE
·  Blood pressure remains  elevated  · Start Benicar 20 mg daily  · Patient will monitor blood pressures at home and will update me via MyChart or phone call in a few weeks  · Goal for BP below 130/80      · Follow-up 3 months

## 2021-06-25 ENCOUNTER — OFFICE VISIT (OUTPATIENT)
Dept: FAMILY MEDICINE CLINIC | Facility: CLINIC | Age: 41
End: 2021-06-25
Payer: COMMERCIAL

## 2021-06-25 VITALS
OXYGEN SATURATION: 97 % | DIASTOLIC BLOOD PRESSURE: 90 MMHG | HEART RATE: 82 BPM | HEIGHT: 70 IN | RESPIRATION RATE: 17 BRPM | WEIGHT: 246.8 LBS | TEMPERATURE: 97.8 F | SYSTOLIC BLOOD PRESSURE: 130 MMHG | BODY MASS INDEX: 35.33 KG/M2

## 2021-06-25 DIAGNOSIS — G56.22 ENTRAPMENT OF LEFT ULNAR NERVE: ICD-10-CM

## 2021-06-25 DIAGNOSIS — J01.20 ACUTE NON-RECURRENT ETHMOIDAL SINUSITIS: Primary | ICD-10-CM

## 2021-06-25 PROCEDURE — 99213 OFFICE O/P EST LOW 20 MIN: CPT | Performed by: FAMILY MEDICINE

## 2021-06-25 PROCEDURE — 1036F TOBACCO NON-USER: CPT | Performed by: FAMILY MEDICINE

## 2021-06-25 PROCEDURE — 3008F BODY MASS INDEX DOCD: CPT | Performed by: FAMILY MEDICINE

## 2021-06-25 RX ORDER — FLUTICASONE PROPIONATE 50 MCG
2 SPRAY, SUSPENSION (ML) NASAL DAILY
Qty: 16 G | Refills: 3 | Status: SHIPPED | OUTPATIENT
Start: 2021-06-25

## 2021-06-25 RX ORDER — AMOXICILLIN AND CLAVULANATE POTASSIUM 875; 125 MG/1; MG/1
1 TABLET, FILM COATED ORAL
Qty: 20 TABLET | Refills: 0 | Status: SHIPPED | OUTPATIENT
Start: 2021-06-25 | End: 2021-07-05

## 2021-06-25 NOTE — PROGRESS NOTES
FAMILY PRACTICE OFFICE VISIT       NAME: Nuha Mckeon  AGE: 36 y o  SEX: male       : 1980        MRN: [id number]        Assessment and Plan     1  Acute non-recurrent ethmoidal sinusitis  -     amoxicillin-clavulanate (Augmentin) 875-125 mg per tablet; Take 1 tablet by mouth 2 (two) times daily after meals for 10 days  -     fluticasone (FLONASE) 50 mcg/act nasal spray; 2 sprays into each nostril daily    2  Entrapment of left ulnar nerve    Patient presents for evaluation of URI symptoms  I suspect that his cough, postnasal drip and hoarseness are likely triggered by sinusitis  Patient was start regimen of Augmentin for 10 days along with Flonase  He will contact me if symptoms are not improving  Patient will wear elbow support brace  He is reluctant to proceed with formal evaluation of possible ulnar neuropathy  He will contact me if symptoms are not improving and will proceed with EMG and Orthopedic/Hand surgery consult         There are no Patient Instructions on file for this visit  No follow-ups on file  Discussed with the patient and all questioned fully answered  He will call me if any problems arise  M*Modal software was used to dictate this note  It may contain errors with dictating incorrect words/spelling  Please contact provider directly with any questions  Chief Complaint     Chief Complaint   Patient presents with    Follow-up     sore throat     Nasal Congestion    Sore Throat       History of Present Illness     Patient presents for evaluation of cough, sore throat and hoarseness  He attributed symptoms of hoarseness, sore throat and cough to thenew blood pressure medication, olmesartan  Reportedly patient has been experiencing cough for 1 months  Recently has developed symptoms of sore throat and hoarseness within past 10 days  He feels congested and his wife reported increased snoring lately    Patient denies symptoms of fever or unusual fatigue  He admits to sinus pressure especially in the left at more add area  He denies symptoms of dizziness or lightheadedness, no shortness of breath, wheezing or chest tightness  Aside from URI symptoms: Patient has not notice any other adverse effects after start of olmesartan  He has been using medication once a day as directed  He denies symptoms of dizziness, lightheadedness, headaches, dyspnea or palpitations  Patient is complaining of tenderness in his left elbow as well as intermittent numbness in his 4th and 5th digits on the left hand  Symptoms are worse with lifting or driving if elbow is not well supported  Patient reports intermittent occasional shooting pain from left elbow down to the 4th and 5th digits  He denies symptoms of neck pain or upper extremity radiculopathy  Sore Throat   This is a new problem  The current episode started 1 to 4 weeks ago  The problem has been gradually worsening  Neither side of throat is experiencing more pain than the other  There has been no fever  The pain is at a severity of 7/10  Associated symptoms include congestion, coughing and a hoarse voice  Pertinent negatives include no abdominal pain, diarrhea, drooling, ear discharge, ear pain, headaches, plugged ear sensation, neck pain, shortness of breath, stridor, swollen glands, trouble swallowing or vomiting  He has had no exposure to strep or mono  Review of Systems   Review of Systems   Constitutional: Negative  Negative for chills, fatigue and fever  HENT: Positive for congestion, hoarse voice, postnasal drip, sinus pressure, sore throat and voice change ( Hoarseness)  Negative for drooling, ear discharge, ear pain and trouble swallowing  Eyes: Negative  Respiratory: Positive for cough  Negative for shortness of breath and stridor  Cardiovascular: Negative  Negative for chest pain, palpitations and leg swelling     Gastrointestinal: Negative for abdominal pain, diarrhea and vomiting  Endocrine: Negative  Musculoskeletal: Positive for arthralgias ( elbow pain)  Negative for neck pain  Neurological: Positive for numbness ( 4th and 5Th digits, left hand)  Negative for headaches  Active Problem List     Patient Active Problem List   Diagnosis    Sinusitis    Diverticular disease    Essential hypertension    Gilbert syndrome    Persistent adjustment disorder with mixed anxiety and depressed mood       Past Medical History:  Past Medical History:   Diagnosis Date    Diverticulitis large intestine        Past Surgical History:  Past Surgical History:   Procedure Laterality Date    COLONOSCOPY      VASECTOMY  01/19/2016    [facility]feren[facility]       Family History:  Family History   Problem Relation Age of Onset    Hodgkin's lymphoma Father        Social History:  Social History     Socioeconomic History    Marital status: /Civil Union     Spouse name: Not on file    Number of children: Not on file    Years of education: Not on file    Highest education level: Not on file   Occupational History    Not on file   Tobacco Use    Smoking status: Never Smoker    Smokeless tobacco: Never Used   Vaping Use    Vaping Use: Never used   Substance and Sexual Activity    Alcohol use: Not Currently     Comment: occasional     Drug use: No    Sexual activity: Not on file   Other Topics Concern    Not on file   Social History Narrative    Not on file     Social Determinants of Health     Financial Resource Strain:     Difficulty of Paying Living Expenses:    Food Insecurity:     Worried About Running Out of Food in the Last Year:     Ran Out of Food in the Last Year:    Transportation Needs:     Lack of Transportation (Medical):      Lack of Transportation (Non-Medical):    Physical Activity:     Days of Exercise per Week:     Minutes of Exercise per Session:    Stress:     Feeling of Stress :    Social Connections:     Frequency of Communication with Friends and Family:     Frequency of Social Gatherings with Friends and Family:     Attends [employer]regation Services:     Active Member of Clubs or Organizations:     Attends Club or Organization Meetings:     Marital Status:    Intimate Partner Violence:     Fear of Current or Ex-Partner:     Emotionally Abused:     Physically Abused:     Sexually Abused:          Objective     Vitals:    06/25/21 1527 06/25/21 1611   BP: 144/96 130/90   BP Location: Left arm Left arm   Patient Position: Sitting    Cuff Size: Large Large   Pulse: 82    Resp: 17    Temp: 97 8 °F (36 6 °C)    TempSrc: Temporal    SpO2: 97%    Weight: 112 kg (246 lb 12 8 oz)    Height: 5' 10" (1 778 m)        Wt Readings from Last 3 Encounters:   06/25/21 112 kg (246 lb 12 8 oz)   05/12/21 109 kg (240 lb)   03/31/21 107 kg (235 lb)       Physical Exam  Vitals and nursing note reviewed  Constitutional:       General: He is not in acute distress  Appearance: Normal appearance  He is well-developed  He is not ill-appearing  HENT:      Head: Normocephalic and atraumatic  Right Ear: Tympanic membrane normal       Left Ear: Tympanic membrane normal    Eyes:      Conjunctiva/sclera: Conjunctivae normal       Pupils: Pupils are equal, round, and reactive to light  Neck:      Thyroid: No thyromegaly  Vascular: No carotid bruit  Cardiovascular:      Rate and Rhythm: Normal rate and regular rhythm  Heart sounds: Normal heart sounds  No murmur heard  Pulmonary:      Effort: Pulmonary effort is normal  No respiratory distress  Breath sounds: Normal breath sounds  No wheezing or rales  Abdominal:      General: Bowel sounds are normal  There is no distension  Palpations: Abdomen is soft  Tenderness: There is no abdominal tenderness  Musculoskeletal:         General: Normal range of motion  Cervical back: Neck supple        Comments: Tenderness at left medial epicondyle   Lymphadenopathy:      Cervical: Cervical adenopathy ( submandibular) present  Skin:     Findings: No rash  Neurological:      Mental Status: He is alert and oriented to person, place, and time  Cranial Nerves: No cranial nerve deficit  Psychiatric:         Behavior: Behavior normal          Thought Content: Thought content normal          Judgment: Judgment normal           Pertinent Laboratory/Diagnostic Studies:    Lab Results   Component Value Date    WBC 5 49 04/09/2021    HGB 15 3 04/09/2021    HCT 45 0 04/09/2021    MCV 90 04/09/2021     04/09/2021       No results found for: TSH    No results found for: CHOL  Lab Results   Component Value Date    TRIG 80 04/09/2021     Lab Results   Component Value Date    HDL 46 04/09/2021     Lab Results   Component Value Date    LDLCALC 133 (H) 04/09/2021     No results found for: HGBA1C  Lab Results   Component Value Date    SODIUM 140 04/09/2021    K 3 8 04/09/2021     04/09/2021    CO2 30 04/09/2021    ANIONGAP 7 02/21/2014    AGAP 4 04/09/2021    BUN 16 04/09/2021    CREATININE 0 99 04/09/2021    GLUF 99 04/09/2021    CALCIUM 8 3 04/09/2021    AST 30 04/09/2021    ALT 56 04/09/2021    ALKPHOS 46 04/09/2021    PROT 6 7 02/21/2014    TP 7 1 04/09/2021    BILITOT 1 4 (H) 02/21/2014    TBILI 2 71 (H) 04/09/2021    EGFR 95 04/09/2021       No orders of the defined types were placed in this encounter        ALLERGIES:  No Known Allergies    Current Medications     Current Outpatient Medications   Medication Sig Dispense Refill    ascorbic acid (VITAMIN C) 250 mg tablet Take 250 mg by mouth daily      dicyclomine (BENTYL) 20 mg tablet Take 1 tablet (20 mg total) by mouth 3 (three) times a day as needed (abdominal bloating) 30 tablet 1    multivitamin (THERAGRAN) TABS Take 1 tablet by mouth daily      olmesartan (BENICAR) 20 mg tablet Take 1 tablet (20 mg total) by mouth daily 30 tablet 3    sertraline (ZOLOFT) 100 mg tablet Take 1/2  tablet once a day for 6 days then take 1 tablet daily 30 tablet 3    amoxicillin-clavulanate (Augmentin) 875-125 mg per tablet Take 1 tablet by mouth 2 (two) times daily after meals for 10 days 20 tablet 0    fluticasone (FLONASE) 50 mcg/act nasal spray 2 sprays into each nostril daily 16 g 3     No current facility-administered medications for this visit  There are no discontinued medications      Health Maintenance     Health Maintenance   Topic Date Due    Hepatitis C Screening  Never done    HIV Screening  Never done    BMI: Followup Plan  03/31/2022    Annual Physical  03/31/2022    BMI: Adult  06/25/2022    DTaP,Tdap,and Td Vaccines (3 - Td or Tdap) 09/05/2030    Influenza Vaccine  Completed    COVID-19 Vaccine  Completed    Pneumococcal Vaccine: Pediatrics (0 to 5 Years) and At-Risk Patients (6 to 59 Years)  Aged Out    HIB Vaccine  Aged Out    Hepatitis B Vaccine  Aged Out    IPV Vaccine  Aged Out    Hepatitis A Vaccine  Aged Out    Meningococcal ACWY Vaccine  Aged Out    HPV Vaccine  Aged [employer] History   Administered Date(s) Administered    INFLUENZA 10/25/2019, 01/11/2021    Influenza Quadrivalent Preservative Free 3 years and older IM 11/08/2014, 11/12/2015    SARS-CoV-2 / COVID-19 mRNA IM ([employer]) 04/02/2021, 04/23/2021    Tdap 03/20/2019, 09/05/2020    Tuberculin Skin Test-PPD Intradermal 11/10/2015    influenza, injectable, quadrivalent 10/20/2018       Valdemar Vaughn MD

## 2021-06-30 PROBLEM — G56.22 ENTRAPMENT OF LEFT ULNAR NERVE: Status: ACTIVE | Noted: 2021-06-30

## 2021-07-07 ENCOUNTER — TELEPHONE (OUTPATIENT)
Dept: FAMILY MEDICINE CLINIC | Facility: CLINIC | Age: 41
End: 2021-07-07

## 2021-07-07 DIAGNOSIS — R05.9 COUGH IN ADULT PATIENT: Primary | ICD-10-CM

## 2021-07-07 RX ORDER — PREDNISONE 10 MG/1
TABLET ORAL
Qty: 20 TABLET | Refills: 0 | Status: SHIPPED | OUTPATIENT
Start: 2021-07-07 | End: 2021-08-25

## 2021-07-07 NOTE — TELEPHONE ENCOUNTER
Please contact patient regarding his concerns  I just send him my chart message, please ask to review  I am sending prescription for prednisone to the pharmacy    Thank you

## 2021-08-25 ENCOUNTER — OFFICE VISIT (OUTPATIENT)
Dept: FAMILY MEDICINE CLINIC | Facility: CLINIC | Age: 41
End: 2021-08-25
Payer: COMMERCIAL

## 2021-08-25 VITALS
SYSTOLIC BLOOD PRESSURE: 128 MMHG | BODY MASS INDEX: 35.58 KG/M2 | RESPIRATION RATE: 16 BRPM | TEMPERATURE: 96.8 F | OXYGEN SATURATION: 98 % | WEIGHT: 248.5 LBS | HEIGHT: 70 IN | HEART RATE: 73 BPM | DIASTOLIC BLOOD PRESSURE: 80 MMHG

## 2021-08-25 DIAGNOSIS — K57.90 DIVERTICULAR DISEASE: ICD-10-CM

## 2021-08-25 DIAGNOSIS — I10 ESSENTIAL HYPERTENSION: Primary | ICD-10-CM

## 2021-08-25 DIAGNOSIS — E66.09 CLASS 2 OBESITY DUE TO EXCESS CALORIES WITHOUT SERIOUS COMORBIDITY WITH BODY MASS INDEX (BMI) OF 35.0 TO 35.9 IN ADULT: ICD-10-CM

## 2021-08-25 DIAGNOSIS — F43.23 PERSISTENT ADJUSTMENT DISORDER WITH MIXED ANXIETY AND DEPRESSED MOOD: ICD-10-CM

## 2021-08-25 PROCEDURE — 1036F TOBACCO NON-USER: CPT | Performed by: FAMILY MEDICINE

## 2021-08-25 PROCEDURE — 99214 OFFICE O/P EST MOD 30 MIN: CPT | Performed by: FAMILY MEDICINE

## 2021-08-25 PROCEDURE — 3008F BODY MASS INDEX DOCD: CPT | Performed by: FAMILY MEDICINE

## 2021-08-25 RX ORDER — OLMESARTAN MEDOXOMIL 20 MG/1
20 TABLET ORAL DAILY
Qty: 30 TABLET | Refills: 6 | Status: SHIPPED | OUTPATIENT
Start: 2021-08-25 | End: 2022-04-02

## 2021-08-25 NOTE — PROGRESS NOTES
FAMILY PRACTICE OFFICE VISIT       NAME: Fely Montenegro  AGE: 36 y o  SEX: male       : 1980        MRN: [id number]        Assessment and Plan     1  Essential hypertension  Assessment & Plan:  Blood pressure is well controlled  Continue Benicar 20 mg daily  Check BMP  Patient is motivated to continue losing weight  Orders:  -     Basic metabolic panel; Future  -     olmesartan (BENICAR) 20 mg tablet; Take 1 tablet (20 mg total) by mouth daily    2  Persistent adjustment disorder with mixed anxiety and depressed mood  Assessment & Plan:   Patient reports significant improvement of symptoms on Zoloft 50 mg daily  Continue current medication regimen  Patient will contact me if symptoms change or worsen    Orders:  -     sertraline (ZOLOFT) 50 mg tablet; Take 1 tab daily    3  Diverticular disease  Assessment & Plan:   Intermittent symptoms of abdominal bloating/ gas  Likely related to anxiety/ IBS  Current symptoms not resemble typical diverticulitis   Abdominal exam is benign  Patient uses Bentyl p r n  He is up-to-date with colonoscopy screening, recent CT abdomen pelvis was unremarkable  Trial of FODMAP diet      4  Class 2 obesity due to excess calories without serious comorbidity with body mass index (BMI) of 35 0 to 35 9 in adult               There are no Patient Instructions on file for this visit  Return in about 6 months (around 2022)  Discussed with the patient and all questioned fully answered  He will call me if any problems arise  M*Modal software was used to dictate this note  It may contain errors with dictating incorrect words/spelling  Please contact provider directly with any questions  Chief Complaint     Chief Complaint   Patient presents with    Follow-up     3 month        History of Present Illness     Patient presents for follow-up  He has been feeling generally well  He remains on Benicar 20 mg once a day for hypertension  No side effects  Patient denies symptoms of headaches, chest pain, palpitations, shortness of breath or dizziness  He remains active and works out on a regular basis  No recent blood pressure checks at home  Symptoms of anxiety/ depression are well controlled on Zoloft 50 mg daily  Patient felt fatigued on the higher dose of medication and reduced it back to 50 mg once a day  He is pretty pleased with results and would like to continue medication  Symptoms of cough have resolved  Left lower quadrant pain:  It occurs on and off, dull, usually in the morning, patient wakes up feeling gassy, symptoms usually resolve as the day goes on  Dicyclomine helps  No fever  Is intermittent left lower quadrant discomfort does not resemble typical pain of diverticulitis  Bowel movements are regular, formed, on the softer side, no melena or blood  Review of Systems   Review of Systems   Constitutional: Negative  HENT: Negative  Eyes: Negative  Respiratory: Negative  Cardiovascular: Negative  Gastrointestinal: Positive for abdominal distention  Negative for anal bleeding, blood in stool, constipation and diarrhea  Intermittent left lower quadrant discomfort, bloating, as per HPI   Endocrine: Negative  Genitourinary: Negative  Musculoskeletal: Negative  Skin: Negative  Allergic/Immunologic: Negative  Neurological: Negative  Hematological: Negative  Psychiatric/Behavioral: Negative           As per HP       Active Problem List     Patient Active Problem List   Diagnosis    Sinusitis    Diverticular disease    Essential hypertension    Gilbert syndrome    Persistent adjustment disorder with mixed anxiety and depressed mood    Entrapment of left ulnar nerve    Class 2 obesity due to excess calories without serious comorbidity with body mass index (BMI) of 35 0 to 35 9 in adult       Past Medical History:  Past Medical History:   Diagnosis Date    Diverticulitis large intestine        Past Surgical History:  Past Surgical History:   Procedure Laterality Date    COLONOSCOPY      VASECTOMY  01/19/2016    [facility] [place]       Family History:  Family History   Problem Relation Age of Onset    Hodgkin's lymphoma Father        Social History:  Social History     Socioeconomic History    Marital status: /Civil Union     Spouse name: Not on file    Number of children: Not on file    Years of education: Not on file    Highest education level: Not on file   Occupational History    Not on file   Tobacco Use    Smoking status: Never Smoker    Smokeless tobacco: Never Used   Vaping Use    Vaping Use: Never used   Substance and Sexual Activity    Alcohol use: Not Currently     Comment: occasional     Drug use: No    Sexual activity: Yes   Other Topics Concern    Not on file   Social History Narrative    Not on file     Social Determinants of Health     Financial Resource Strain:     Difficulty of Paying Living Expenses:    Food Insecurity:     Worried About Running Out of Food in the Last Year:     [address] in the Last Year:    Transportation Needs:     Lack of Transportation (Medical):      Lack of Transportation (Non-Medical):    Physical Activity:     Days of Exercise per Week:     Minutes of Exercise per Session:    Stress:     Feeling of Stress :    Social Connections:     Frequency of Communication with Friends and Family:     Frequency of Social Gatherings with Friends and Family:     Attends [employer] Services:     Active Member of Clubs or Organizations:     Attends Club or Organization Meetings:     Marital Status:    Intimate Partner Violence:     Fear of Current or Ex-Partner:     Emotionally Abused:     Physically Abused:     Sexually Abused:          Objective     Vitals:    08/25/21 1357 08/25/21 1431 08/25/21 1432   BP: 144/88 118/80 128/80   BP Location:  Right arm Left arm   Cuff Size:  Large    Pulse: 73 Resp: 16     Temp: (!) 96 8 °F (36 °C)     SpO2: 98%     Weight: 113 kg (248 lb 8 oz)     Height: 5' 10" (1 778 m)         Wt Readings from Last 3 Encounters:   08/25/21 113 kg (248 lb 8 oz)   06/25/21 112 kg (246 lb 12 8 oz)   05/12/21 109 kg (240 lb)       Physical Exam  Vitals and nursing note reviewed  Constitutional:       General: He is not in acute distress  Appearance: Normal appearance  He is well-developed  He is not ill-appearing  HENT:      Head: Normocephalic and atraumatic  Eyes:      Conjunctiva/sclera: Conjunctivae normal    Neck:      Vascular: No carotid bruit  Cardiovascular:      Rate and Rhythm: Normal rate and regular rhythm  Heart sounds: Normal heart sounds  No murmur heard  Pulmonary:      Effort: Pulmonary effort is normal  No respiratory distress  Breath sounds: Normal breath sounds  No wheezing or rales  Abdominal:      General: Bowel sounds are normal  There is no distension or abdominal bruit  Palpations: Abdomen is soft  Tenderness: There is no abdominal tenderness  Hernia: No hernia is present  Musculoskeletal:         General: Normal range of motion  Cervical back: Neck supple  No rigidity  Right lower leg: No edema  Left lower leg: No edema  Skin:     General: Skin is warm  Findings: No rash  Neurological:      General: No focal deficit present  Mental Status: He is alert and oriented to person, place, and time  Cranial Nerves: No cranial nerve deficit  Psychiatric:         Mood and Affect: Mood normal          Behavior: Behavior normal          Thought Content:  Thought content normal           Pertinent Laboratory/Diagnostic Studies:    Lab Results   Component Value Date    WBC 5 49 04/09/2021    HGB 15 3 04/09/2021    HCT 45 0 04/09/2021    MCV 90 04/09/2021     04/09/2021       No results found for: TSH    No results found for: CHOL  Lab Results   Component Value Date    TRIG 80 04/09/2021 Lab Results   Component Value Date    HDL 46 04/09/2021     Lab Results   Component Value Date    LDLCALC 133 (H) 04/09/2021     No results found for: HGBA1C  Lab Results   Component Value Date    SODIUM 140 04/09/2021    K 3 8 04/09/2021     04/09/2021    CO2 30 04/09/2021    ANIONGAP 7 02/21/2014    AGAP 4 04/09/2021    BUN 16 04/09/2021    CREATININE 0 99 04/09/2021    GLUF 99 04/09/2021    CALCIUM 8 3 04/09/2021    AST 30 04/09/2021    ALT 56 04/09/2021    ALKPHOS 46 04/09/2021    PROT 6 7 02/21/2014    TP 7 1 04/09/2021    BILITOT 1 4 (H) 02/21/2014    TBILI 2 71 (H) 04/09/2021    EGFR 95 04/09/2021       Orders Placed This Encounter   Procedures    Basic metabolic panel       ALLERGIES:  No Known Allergies    Current Medications     Current Outpatient Medications   Medication Sig Dispense Refill    ascorbic acid (VITAMIN C) 250 mg tablet Take 250 mg by mouth daily      dicyclomine (BENTYL) 20 mg tablet Take 1 tablet (20 mg total) by mouth 3 (three) times a day as needed (abdominal bloating) 30 tablet 1    fluticasone (FLONASE) 50 mcg/act nasal spray 2 sprays into each nostril daily 16 g 3    multivitamin (THERAGRAN) TABS Take 1 tablet by mouth daily      olmesartan (BENICAR) 20 mg tablet Take 1 tablet (20 mg total) by mouth daily 30 tablet 6    sertraline (ZOLOFT) 50 mg tablet Take 1 tab daily 30 tablet 11     No current facility-administered medications for this visit         Medications Discontinued During This Encounter   Medication Reason    predniSONE 10 mg tablet     sertraline (ZOLOFT) 100 mg tablet Reorder    olmesartan (BENICAR) 20 mg tablet Reorder       Health Maintenance     Health Maintenance   Topic Date Due    Hepatitis C Screening  Never done    HIV Screening  Never done    Influenza Vaccine (1) 09/01/2021    BMI: Followup Plan  03/31/2022    Annual Physical  03/31/2022    BMI: Adult  08/25/2022    DTaP,Tdap,and Td Vaccines (3 - Td or Tdap) 09/05/2030    COVID-19 Vaccine  Completed    Pneumococcal Vaccine: Pediatrics (0 to 5 Years) and At-Risk Patients (6 to 59 Years)  Aged Out    HIB Vaccine  Aged Out    Hepatitis B Vaccine  Aged Out    IPV Vaccine  Aged Out    Hepatitis A Vaccine  Aged Out    Meningococcal ACWY Vaccine  Aged Out    HPV Vaccine  Aged [employer] History   Administered Date(s) Administered    INFLUENZA 10/25/2019, 01/11/2021    Influenza Quadrivalent Preservative Free 3 years and older IM 11/08/2014, 11/12/2015    SARS-CoV-2 / COVID-19 mRNA IM ([employer]) 04/02/2021, 04/23/2021    Tdap 03/20/2019, 09/05/2020    Tuberculin Skin Test-PPD Intradermal 11/10/2015    influenza, injectable, quadrivalent 10/20/2018       Ariella Ling MD

## 2021-08-31 PROBLEM — E66.812 CLASS 2 OBESITY DUE TO EXCESS CALORIES WITHOUT SERIOUS COMORBIDITY WITH BODY MASS INDEX (BMI) OF 35.0 TO 35.9 IN ADULT: Status: ACTIVE | Noted: 2021-08-31

## 2021-08-31 PROBLEM — E66.09 CLASS 2 OBESITY DUE TO EXCESS CALORIES WITHOUT SERIOUS COMORBIDITY WITH BODY MASS INDEX (BMI) OF 35.0 TO 35.9 IN ADULT: Status: ACTIVE | Noted: 2021-08-31

## 2021-08-31 NOTE — ASSESSMENT & PLAN NOTE
Blood pressure is well controlled  Continue Benicar 20 mg daily  Check BMP  Patient is motivated to continue losing weight

## 2021-08-31 NOTE — ASSESSMENT & PLAN NOTE
Patient reports significant improvement of symptoms on Zoloft 50 mg daily  Continue current medication regimen    Patient will contact me if symptoms change or worsen

## 2021-08-31 NOTE — ASSESSMENT & PLAN NOTE
Intermittent symptoms of abdominal bloating/ gas  Likely related to anxiety/ IBS  Current symptoms not resemble typical diverticulitis   Abdominal exam is benign  Patient uses Bentyl p r n  He is up-to-date with colonoscopy screening, recent CT abdomen pelvis was unremarkable      Trial of FODMAP diet

## 2021-12-11 ENCOUNTER — NURSE TRIAGE (OUTPATIENT)
Dept: OTHER | Facility: OTHER | Age: 41
End: 2021-12-11

## 2021-12-15 ENCOUNTER — TELEMEDICINE (OUTPATIENT)
Dept: FAMILY MEDICINE CLINIC | Facility: CLINIC | Age: 41
End: 2021-12-15
Payer: COMMERCIAL

## 2021-12-15 VITALS — WEIGHT: 248 LBS | BODY MASS INDEX: 35.5 KG/M2 | HEIGHT: 70 IN

## 2021-12-15 DIAGNOSIS — U07.1 COVID-19: Primary | ICD-10-CM

## 2021-12-15 PROCEDURE — 1036F TOBACCO NON-USER: CPT | Performed by: FAMILY MEDICINE

## 2021-12-15 PROCEDURE — 3008F BODY MASS INDEX DOCD: CPT | Performed by: FAMILY MEDICINE

## 2021-12-15 PROCEDURE — 99213 OFFICE O/P EST LOW 20 MIN: CPT | Performed by: FAMILY MEDICINE

## 2022-03-19 ENCOUNTER — IMMUNIZATIONS (OUTPATIENT)
Dept: FAMILY MEDICINE CLINIC | Facility: HOSPITAL | Age: 42
End: 2022-03-19

## 2022-03-19 PROCEDURE — 91305 COVID-19 PFIZER VACC TRIS-SUCROSE GRAY CAP 0.3 ML: CPT

## 2022-03-19 PROCEDURE — 0051A COVID-19 PFIZER VACC TRIS-SUCROSE GRAY CAP 0.3 ML: CPT

## 2022-04-02 DIAGNOSIS — I10 ESSENTIAL HYPERTENSION: ICD-10-CM

## 2022-04-02 RX ORDER — OLMESARTAN MEDOXOMIL 20 MG/1
TABLET ORAL
Qty: 30 TABLET | Refills: 6 | Status: SHIPPED | OUTPATIENT
Start: 2022-04-02

## 2022-11-08 ENCOUNTER — OFFICE VISIT (OUTPATIENT)
Dept: FAMILY MEDICINE CLINIC | Facility: CLINIC | Age: 42
End: 2022-11-08

## 2022-11-08 VITALS
SYSTOLIC BLOOD PRESSURE: 144 MMHG | BODY MASS INDEX: 37.37 KG/M2 | HEIGHT: 70 IN | RESPIRATION RATE: 16 BRPM | WEIGHT: 261 LBS | OXYGEN SATURATION: 97 % | HEART RATE: 78 BPM | DIASTOLIC BLOOD PRESSURE: 96 MMHG | TEMPERATURE: 98 F

## 2022-11-08 DIAGNOSIS — I10 ESSENTIAL HYPERTENSION: ICD-10-CM

## 2022-11-08 DIAGNOSIS — H61.23 BILATERAL IMPACTED CERUMEN: Primary | ICD-10-CM

## 2022-11-08 DIAGNOSIS — F43.23 PERSISTENT ADJUSTMENT DISORDER WITH MIXED ANXIETY AND DEPRESSED MOOD: ICD-10-CM

## 2022-11-08 RX ORDER — OLMESARTAN MEDOXOMIL 20 MG/1
20 TABLET ORAL DAILY
Qty: 30 TABLET | Refills: 6 | Status: SHIPPED | OUTPATIENT
Start: 2022-11-08

## 2022-11-08 NOTE — PROGRESS NOTES
Name: Adenike Quiroga      : 1980      MRN: [id number]  Encounter Provider: Emre Bell MD  Encounter Date: 2022   Encounter department: [address] [place] [address]     1  Bilateral impacted cerumen    2  Persistent adjustment disorder with mixed anxiety and depressed mood  -     sertraline (ZOLOFT) 50 mg tablet; TAKE ONE TABLET BY MOUTH EVERY DAY    3  Essential hypertension  -     olmesartan (BENICAR) 20 mg tablet; Take 1 tablet (20 mg total) by mouth daily  -     CBC and differential; Future  -     Comprehensive metabolic panel; Future  -     Lipid Panel with Direct LDL reflex; Future  -     TSH, 3rd generation; Future  Successful cerumen evacuation  Patient should restart blood pressure medication ASAP  Proceed with routine blood work  Symptoms of anxiety/depression have been well controlled on Zoloft  Subjective     Right ear feels clogged for a few days  No cold symptoms, no nasal congestion  Tried Debrox and OTC flushing syringe    Out od B/P Rx for 4 days  Patient states that he has been taking Benicar 21 milligrams daily otherwise  He feels well on this medication, no headaches, no facial flushing  Symptoms of anxiety/depression have been well controlled on Zoloft 50 milligrams daily  Patient is due for routine blood work    Ear Fullness   Associated symptoms include hearing loss (As per HPI)  Review of Systems   Constitutional: Negative  HENT: Positive for hearing loss (As per HPI)  Negative for congestion  Eyes: Negative  Respiratory: Negative  Cardiovascular: Negative  Neurological: Negative          Past Medical History:   Diagnosis Date   • Diverticulitis large intestine      Past Surgical History:   Procedure Laterality Date   • COLONOSCOPY     • VASECTOMY  2016    Vas Deferens     Family History   Problem Relation Age of Onset   • Hodgkin's lymphoma Father      Social History     Socioeconomic History   • Marital status: /Civil Union     Spouse name: None   • Number of children: None   • Years of education: None   • Highest education level: None   Occupational History   • None   Tobacco Use   • Smoking status: Never Smoker   • Smokeless tobacco: Never Used   Vaping Use   • Vaping Use: Never used   Substance and Sexual Activity   • Alcohol use: Not Currently     Comment: occasional    • Drug use: No   • Sexual activity: Yes   Other Topics Concern   • None   Social History Narrative   • None     Social Determinants of Health     Financial Resource Strain: Not on file   Food Insecurity: Not on file   Transportation Needs: Not on file   Physical Activity: Not on file   Stress: Not on file   Social Connections: Not on file   Intimate Partner Violence: Not on file   Housing Stability: Not on file     Current Outpatient Medications on File Prior to Visit   Medication Sig   • ascorbic acid (VITAMIN C) 250 mg tablet Take 250 mg by mouth daily   • multivitamin (THERAGRAN) TABS Take 1 tablet by mouth daily   • dicyclomine (BENTYL) 20 mg tablet Take 1 tablet (20 mg total) by mouth 3 (three) times a day as needed (abdominal bloating) (Patient not taking: No sig reported)   • fluticasone (FLONASE) 50 mcg/act nasal spray 2 sprays into each nostril daily (Patient not taking: No sig reported)     No Known Allergies  Immunization History   Administered Date(s) Administered   • COVID-19 PFIZER VACCINE 0 3 ML IM 04/02/2021, 04/23/2021   • COVID-19 Pfizer vac (Guzman-sucrose, gray cap) 12 yr+ IM 03/19/2022   • INFLUENZA 10/25/2019, 01/11/2021   • Influenza Quadrivalent Preservative Free 3 years and older IM 11/08/2014, 11/12/2015   • Tdap 03/20/2019, 09/05/2020   • Tuberculin Skin Test-PPD Intradermal 11/10/2015   • influenza, injectable, quadrivalent 10/20/2018       Objective     /96   Pulse 78   Temp 98 °F (36 7 °C) (Temporal)   Resp 16   Ht 5' 10" (1 778 m)   Wt 118 kg (261 lb)   SpO2 97%   BMI 37 45 kg/m² Physical Exam  Constitutional:       Appearance: Normal appearance  HENT:      Head: Normocephalic and atraumatic  Right Ear: There is impacted cerumen  Left Ear: There is impacted cerumen  Pulmonary:      Breath sounds: Normal breath sounds  Neurological:      General: No focal deficit present  Mental Status: He is alert and oriented to person, place, and time  Psychiatric:         Mood and Affect: Mood normal          Behavior: Behavior normal          Thought Content: Thought content normal      Cerumen evacuation bilateral TMs, soft cerumen removed without difficulty with irrigation  Patient tolerated well  TMs are clear  He reports improved hearing        Emil Mcnair MD

## 2023-05-23 DIAGNOSIS — F43.23 PERSISTENT ADJUSTMENT DISORDER WITH MIXED ANXIETY AND DEPRESSED MOOD: ICD-10-CM

## 2023-06-06 ENCOUNTER — TELEPHONE (OUTPATIENT)
Dept: FAMILY MEDICINE CLINIC | Facility: CLINIC | Age: 43
End: 2023-06-06

## 2023-06-06 DIAGNOSIS — I10 ESSENTIAL HYPERTENSION: ICD-10-CM

## 2023-06-07 RX ORDER — OLMESARTAN MEDOXOMIL 20 MG/1
TABLET ORAL
Qty: 30 TABLET | Refills: 3 | Status: SHIPPED | OUTPATIENT
Start: 2023-06-07

## 2023-06-07 NOTE — TELEPHONE ENCOUNTER
Patient is due for annual physical and blood work  Lab orders were placed back in November 2022  Please remind him to proceed  I just refilled his blood pressure medication      Thank you

## 2023-07-13 ENCOUNTER — APPOINTMENT (OUTPATIENT)
Dept: LAB | Facility: CLINIC | Age: 43
End: 2023-07-13
Payer: COMMERCIAL

## 2023-07-13 DIAGNOSIS — I10 ESSENTIAL HYPERTENSION: ICD-10-CM

## 2023-07-13 LAB
ALBUMIN SERPL BCP-MCNC: 4.2 G/DL (ref 3.5–5)
ALP SERPL-CCNC: 53 U/L (ref 46–116)
ALT SERPL W P-5'-P-CCNC: 41 U/L (ref 12–78)
ANION GAP SERPL CALCULATED.3IONS-SCNC: 0 MMOL/L
AST SERPL W P-5'-P-CCNC: 31 U/L (ref 5–45)
BASOPHILS # BLD AUTO: 0.04 THOUSANDS/ÂΜL (ref 0–0.1)
BASOPHILS NFR BLD AUTO: 1 % (ref 0–1)
BILIRUB SERPL-MCNC: 1.47 MG/DL (ref 0.2–1)
BUN SERPL-MCNC: 26 MG/DL (ref 5–25)
CALCIUM SERPL-MCNC: 9 MG/DL (ref 8.3–10.1)
CHLORIDE SERPL-SCNC: 107 MMOL/L (ref 96–108)
CHOLEST SERPL-MCNC: 198 MG/DL
CO2 SERPL-SCNC: 30 MMOL/L (ref 21–32)
CREAT SERPL-MCNC: 1.13 MG/DL (ref 0.6–1.3)
EOSINOPHIL # BLD AUTO: 0.05 THOUSAND/ÂΜL (ref 0–0.61)
EOSINOPHIL NFR BLD AUTO: 1 % (ref 0–6)
ERYTHROCYTE [DISTWIDTH] IN BLOOD BY AUTOMATED COUNT: 13.2 % (ref 11.6–15.1)
GFR SERPL CREATININE-BSD FRML MDRD: 79 ML/MIN/1.73SQ M
GLUCOSE P FAST SERPL-MCNC: 90 MG/DL (ref 65–99)
HCT VFR BLD AUTO: 45.8 % (ref 36.5–49.3)
HDLC SERPL-MCNC: 52 MG/DL
HGB BLD-MCNC: 15.3 G/DL (ref 12–17)
IMM GRANULOCYTES # BLD AUTO: 0 THOUSAND/UL (ref 0–0.2)
IMM GRANULOCYTES NFR BLD AUTO: 0 % (ref 0–2)
LDLC SERPL CALC-MCNC: 132 MG/DL (ref 0–100)
LYMPHOCYTES # BLD AUTO: 1.99 THOUSANDS/ÂΜL (ref 0.6–4.47)
LYMPHOCYTES NFR BLD AUTO: 43 % (ref 14–44)
MCH RBC QN AUTO: 31 PG (ref 26.8–34.3)
MCHC RBC AUTO-ENTMCNC: 33.4 G/DL (ref 31.4–37.4)
MCV RBC AUTO: 93 FL (ref 82–98)
MONOCYTES # BLD AUTO: 0.42 THOUSAND/ÂΜL (ref 0.17–1.22)
MONOCYTES NFR BLD AUTO: 9 % (ref 4–12)
NEUTROPHILS # BLD AUTO: 2.08 THOUSANDS/ÂΜL (ref 1.85–7.62)
NEUTS SEG NFR BLD AUTO: 46 % (ref 43–75)
NRBC BLD AUTO-RTO: 0 /100 WBCS
PLATELET # BLD AUTO: 168 THOUSANDS/UL (ref 149–390)
PMV BLD AUTO: 10.7 FL (ref 8.9–12.7)
POTASSIUM SERPL-SCNC: 4.8 MMOL/L (ref 3.5–5.3)
PROT SERPL-MCNC: 7.1 G/DL (ref 6.4–8.4)
RBC # BLD AUTO: 4.94 MILLION/UL (ref 3.88–5.62)
SODIUM SERPL-SCNC: 137 MMOL/L (ref 135–147)
TRIGL SERPL-MCNC: 69 MG/DL
TSH SERPL DL<=0.05 MIU/L-ACNC: 2.22 UIU/ML (ref 0.45–4.5)
WBC # BLD AUTO: 4.58 THOUSAND/UL (ref 4.31–10.16)

## 2023-07-13 PROCEDURE — 80053 COMPREHEN METABOLIC PANEL: CPT

## 2023-07-13 PROCEDURE — 84443 ASSAY THYROID STIM HORMONE: CPT

## 2023-07-13 PROCEDURE — 85025 COMPLETE CBC W/AUTO DIFF WBC: CPT

## 2023-07-13 PROCEDURE — 36415 COLL VENOUS BLD VENIPUNCTURE: CPT

## 2023-07-13 PROCEDURE — 80061 LIPID PANEL: CPT

## 2023-07-19 ENCOUNTER — OFFICE VISIT (OUTPATIENT)
Dept: FAMILY MEDICINE CLINIC | Facility: CLINIC | Age: 43
End: 2023-07-19
Payer: COMMERCIAL

## 2023-07-19 VITALS
HEIGHT: 70 IN | HEART RATE: 74 BPM | RESPIRATION RATE: 16 BRPM | WEIGHT: 225 LBS | TEMPERATURE: 98.1 F | OXYGEN SATURATION: 97 % | BODY MASS INDEX: 32.21 KG/M2 | DIASTOLIC BLOOD PRESSURE: 72 MMHG | SYSTOLIC BLOOD PRESSURE: 118 MMHG

## 2023-07-19 DIAGNOSIS — M75.41 SHOULDER IMPINGEMENT SYNDROME, RIGHT: ICD-10-CM

## 2023-07-19 DIAGNOSIS — G89.29 CHRONIC LEFT-SIDED LOW BACK PAIN WITHOUT SCIATICA: ICD-10-CM

## 2023-07-19 DIAGNOSIS — I10 ESSENTIAL HYPERTENSION: ICD-10-CM

## 2023-07-19 DIAGNOSIS — F43.23 PERSISTENT ADJUSTMENT DISORDER WITH MIXED ANXIETY AND DEPRESSED MOOD: ICD-10-CM

## 2023-07-19 DIAGNOSIS — M54.50 CHRONIC LEFT-SIDED LOW BACK PAIN WITHOUT SCIATICA: ICD-10-CM

## 2023-07-19 DIAGNOSIS — Z00.00 ENCOUNTER FOR WELLNESS EXAMINATION IN ADULT: Primary | ICD-10-CM

## 2023-07-19 PROBLEM — U07.1 COVID-19: Status: RESOLVED | Noted: 2021-12-15 | Resolved: 2023-07-19

## 2023-07-19 PROCEDURE — 99213 OFFICE O/P EST LOW 20 MIN: CPT | Performed by: FAMILY MEDICINE

## 2023-07-19 PROCEDURE — 99396 PREV VISIT EST AGE 40-64: CPT | Performed by: FAMILY MEDICINE

## 2023-07-19 RX ORDER — METHOCARBAMOL 750 MG/1
750 TABLET, FILM COATED ORAL
Qty: 30 TABLET | Refills: 2 | Status: SHIPPED | OUTPATIENT
Start: 2023-07-19

## 2023-07-19 NOTE — PROGRESS NOTES
Name: Abdiel Rodriguez      : 1980      MRN: [id number]  Encounter Provider: Jacob Sharma MD  Encounter Date: 2023   Encounter department: [address] [place] [address]     1. Encounter for wellness examination in adult  Comments:  I commended patient on healthy lifestyle, weight loss, regular exercise    2. Essential hypertension  Assessment & Plan:  Blood pressure is well controlled, continue olmesartan 20 mg daily      3. Persistent adjustment disorder with mixed anxiety and depressed mood  Assessment & Plan:  Symptoms are well controlled on Zoloft 50 mg daily      4. Shoulder impingement syndrome, right  -     Ambulatory referral to Orthopedic Surgery; Future  -     methocarbamol (Robaxin-750) 750 mg tablet; Take 1 tablet (750 mg total) by mouth daily at bedtime as needed for muscle spasms    5. Chronic left-sided low back pain without sciatica  Assessment & Plan:  Trial of Robaxin PRN. Continue stretching exercises. Consider formal PT/specialty evaluation if symptoms persist.  Patient will proceed with x-rays    Orders:  -     XR spine lumbar minimum 4 views non injury; Future; Expected date: 2023  -     XR hip/pelv 4+ vw left if performed; Future; Expected date: 2023  -     XR sacroiliac joints 3+ views; Future; Expected date: 2023           Subjective     Annual well exam, patient feels well. No complaints of chest pain palpitations, shortness of breath or dizziness. Symptoms of anxiety/depression are well controlled on Zoloft 50 mg daily. He remains on olmesartan 20 mg daily for hypertension. Lost  around 40 lb weight with exercise and healthy diet. Labs d/w pt      Right shoulder pain, of RCT,surgery 10 plus years ago   Pectoralis tendon repair- Elan Stovall-    Previous RC and labral tear  - [place] [id number]    Patient reports upper back spasm. He has difficulty sleeping on the right side due to pain.   Shoulder is clicking and grinding with range of motion. Patient is also complaining of left hip pain and lower back pain. Back pain is worse with laying down on the stomach or flat on the back. Worse with getting up and range of motion. Review of Systems   Constitutional: Negative. HENT: Negative. Eyes: Negative. Respiratory: Negative. Cardiovascular: Negative. Gastrointestinal: Negative. Endocrine: Negative. Genitourinary: Negative. Musculoskeletal: Positive for arthralgias. Skin: Negative. Allergic/Immunologic: Negative. Neurological: Negative. Hematological: Negative. Psychiatric/Behavioral: Negative.         Past Medical History:   Diagnosis Date   • Allergic     Seasonal   • Diverticulitis large intestine    • Diverticulitis of colon      Past Surgical History:   Procedure Laterality Date   • COLONOSCOPY     • VASECTOMY  01/19/2016    Vas Deferens     Family History   Problem Relation Age of Onset   • Hyperlipidemia Mother    • Hypertension Mother    • Hodgkin's lymphoma Father    • Cancer Father    • Hypertension Brother      Social History     Socioeconomic History   • Marital status: /Civil Union     Spouse name: None   • Number of children: None   • Years of education: None   • Highest education level: None   Occupational History   • None   Tobacco Use   • Smoking status: Never   • Smokeless tobacco: Never   Vaping Use   • Vaping Use: Never used   Substance and Sexual Activity   • Alcohol use: Not Currently     Comment: occasional    • Drug use: No   • Sexual activity: Yes     Partners: Female     Birth control/protection: Male Sterilization   Other Topics Concern   • None   Social History Narrative   • None     Social Determinants of Health     Financial Resource Strain: Not on file   Food Insecurity: Not on file   Transportation Needs: Not on file   Physical Activity: Not on file   Stress: Not on file   Social Connections: Not on file   Intimate Partner Violence: Not on file Housing Stability: Not on file     Current Outpatient Medications on File Prior to Visit   Medication Sig   • ascorbic acid (VITAMIN C) 250 mg tablet Take 250 mg by mouth daily   • fluticasone (FLONASE) 50 mcg/act nasal spray 2 sprays into each nostril daily   • multivitamin (THERAGRAN) TABS Take 1 tablet by mouth daily   • olmesartan (BENICAR) 20 mg tablet TAKE ONE TABLET BY MOUTH EVERY DAY   • sertraline (ZOLOFT) 50 mg tablet TAKE ONE TABLET BY MOUTH EVERY DAY     No Known Allergies  Immunization History   Administered Date(s) Administered   • COVID-19 PFIZER VACCINE 0.3 ML IM 04/02/2021, 04/23/2021   • COVID-19 Pfizer vac (Guzman-sucrose, gray cap) 12 yr+ IM 03/19/2022   • INFLUENZA 10/25/2019, 01/11/2021   • Influenza Quadrivalent Preservative Free 3 years and older IM 11/08/2014, 11/12/2015   • Tdap 03/20/2019, 09/05/2020   • Tuberculin Skin Test-PPD Intradermal 11/10/2015   • influenza, injectable, quadrivalent 10/20/2018       Objective     /72   Pulse 74   Temp 98.1 °F (36.7 °C) (Temporal)   Resp 16   Ht 5' 10" (1.778 m)   Wt 102 kg (225 lb)   SpO2 97%   BMI 32.28 kg/m²     Physical Exam  Vitals and nursing note reviewed. Constitutional:       General: He is not in acute distress. Appearance: Normal appearance. He is well-developed. He is not ill-appearing. HENT:      Head: Normocephalic and atraumatic. Eyes:      Conjunctiva/sclera: Conjunctivae normal.      Pupils: Pupils are equal, round, and reactive to light. Neck:      Thyroid: No thyromegaly. Vascular: No carotid bruit. Cardiovascular:      Rate and Rhythm: Normal rate and regular rhythm. Heart sounds: Normal heart sounds. No murmur heard. Pulmonary:      Effort: Pulmonary effort is normal. No respiratory distress. Breath sounds: Normal breath sounds. No wheezing or rales. Abdominal:      General: Bowel sounds are normal. There is no distension. Palpations: Abdomen is soft. Tenderness:  There is no abdominal tenderness. Musculoskeletal:         General: Normal range of motion. Cervical back: Neck supple. Right lower leg: No edema. Left lower leg: No edema. Comments: Reproducible tenderness with palpation of left SI joint and left sacroiliac crest.  No tenderness with palpation of hip. Paraspinal spasm L4-L5 L5-S1    Right shoulder: Restricted range of motion with abduction and extension and pain. Skin:     Findings: No rash. Neurological:      Mental Status: He is alert and oriented to person, place, and time. Cranial Nerves: No cranial nerve deficit. Psychiatric:         Behavior: Behavior normal.         Thought Content:  Thought content normal.         Judgment: Judgment normal.       Sean Li MD

## 2023-07-22 PROBLEM — G89.29 CHRONIC LEFT-SIDED LOW BACK PAIN WITHOUT SCIATICA: Status: ACTIVE | Noted: 2023-07-22

## 2023-07-22 PROBLEM — M54.50 CHRONIC LEFT-SIDED LOW BACK PAIN WITHOUT SCIATICA: Status: ACTIVE | Noted: 2023-07-22

## 2023-07-22 NOTE — TELEPHONE ENCOUNTER
Patient called stating he has been taking the antibiotics on Tuesday that were prescribed for diverticulitis he is stating that the symptoms are getting worse  Patient uses [facility] in [place]  Any questions please contact patient at [phone]  Viral syndrome

## 2023-07-23 NOTE — ASSESSMENT & PLAN NOTE
Trial of Robaxin PRN. Continue stretching exercises.   Consider formal PT/specialty evaluation if symptoms persist.  Patient will proceed with x-rays

## 2023-07-31 ENCOUNTER — OFFICE VISIT (OUTPATIENT)
Dept: OBGYN CLINIC | Facility: OTHER | Age: 43
End: 2023-07-31

## 2023-07-31 ENCOUNTER — APPOINTMENT (OUTPATIENT)
Dept: RADIOLOGY | Facility: OTHER | Age: 43
End: 2023-07-31
Payer: COMMERCIAL

## 2023-07-31 VITALS
BODY MASS INDEX: 33.36 KG/M2 | DIASTOLIC BLOOD PRESSURE: 76 MMHG | HEIGHT: 70 IN | WEIGHT: 233 LBS | SYSTOLIC BLOOD PRESSURE: 120 MMHG | HEART RATE: 69 BPM

## 2023-07-31 DIAGNOSIS — M25.511 CHRONIC RIGHT SHOULDER PAIN: ICD-10-CM

## 2023-07-31 DIAGNOSIS — M25.511 RIGHT SHOULDER PAIN, UNSPECIFIED CHRONICITY: ICD-10-CM

## 2023-07-31 DIAGNOSIS — M19.011 LOCALIZED PRIMARY OSTEOARTHRITIS OF RIGHT SHOULDER REGION: Primary | ICD-10-CM

## 2023-07-31 DIAGNOSIS — G89.29 CHRONIC RIGHT SHOULDER PAIN: ICD-10-CM

## 2023-07-31 PROCEDURE — 73030 X-RAY EXAM OF SHOULDER: CPT

## 2023-07-31 RX ORDER — BETAMETHASONE SODIUM PHOSPHATE AND BETAMETHASONE ACETATE 3; 3 MG/ML; MG/ML
6 INJECTION, SUSPENSION INTRA-ARTICULAR; INTRALESIONAL; INTRAMUSCULAR; SOFT TISSUE
Status: COMPLETED | OUTPATIENT
Start: 2023-07-31 | End: 2023-07-31

## 2023-07-31 RX ORDER — BUPIVACAINE HYDROCHLORIDE 2.5 MG/ML
2 INJECTION, SOLUTION INFILTRATION; PERINEURAL
Status: COMPLETED | OUTPATIENT
Start: 2023-07-31 | End: 2023-07-31

## 2023-07-31 RX ADMIN — BUPIVACAINE HYDROCHLORIDE 2 ML: 2.5 INJECTION, SOLUTION INFILTRATION; PERINEURAL at 09:00

## 2023-07-31 RX ADMIN — BETAMETHASONE SODIUM PHOSPHATE AND BETAMETHASONE ACETATE 6 MG: 3; 3 INJECTION, SUSPENSION INTRA-ARTICULAR; INTRALESIONAL; INTRAMUSCULAR; SOFT TISSUE at 09:00

## 2023-07-31 NOTE — PATIENT INSTRUCTIONS
CORTICOSTEROID INJECTION  What is a corticosteroid? Injuries or disease such as arthritis, bursitis or tendonitis result in inflammation. In turn, this inflammation can cause swelling and pain. A local injection of a corticosteroid is provided to diminish inflammation. By doing so, it will also decrease pain and swelling which is making you uncomfortable. Is this the same thing as a Cortisone Injection? Cortisone® is a brand name of a corticosteroid used commonly in the past.  Today I commonly use a more water-soluble corticosteroid named Celestone®. Will the injection hurt? As with any injection, you may feel pain at the time of the injection. Typically, I use a local anesthetic (Lary Taco) in addition to the corticosteroid to determine if the injection has been placed in the appropriate location. Hence it is important to monitor your symptoms 4-6 hours after the injection, as the area will be anesthetized (numb) while the local anesthetic is working. Once the local anesthetic wears off, the intensity of pain can be the same as it was prior to the injection, or even worse. This does not mean that the injection is not working. The corticosteroid may take 24-72 hours to begin having a positive effect. If you do experience an increase in pain, the use of an ice pack on the area for 20 minutes at a time should help. It is also helpful to take an oral anti-inflammatory such as Tylenol® or Motrin® if you are able to medically do so. For this reason it is best to avoid activities that put stress on the area the first 24 hours after the injection. How long will pain relief last?  This will vary according to the type and severity of the symptoms being treated and the severity of the condition. Symptom relief may last weeks to months. I typically couple injections with physical therapy so that the underlying problem causing the inflammation may be treated as the pain diminishes.   If the combination is not successful, you may be a surgical candidate. I have read bad things about steroids. Will these things happen to me? Corticosteroids, when utilized properly, are safe and effective drugs. When used in a low dose, potential adverse reactions are very rare. Some patients may experience a sensation of flushing for several days. Very rarely, there can be a local reaction which may include increased discomfort for a period of time in the areas that has been injected. A steroid should not be used over and over again. Multiple injections in the same area can produce adverse effects such as tissue atrophy and degeneration of tendon or cartilage. A small percentage of patients (< 0.1%) may develop an infection in the joint after injection. This is a treatable problem, but if neglected, may result in permanent disability. Signs of infection include redness, swelling, discharge, fevers, increasing pain and drainage from the injection site. This represents an emergency and you should contact our office immediately or seek treatment in the ER if after hours. If I have diabetes, will this injection affect me? If you are diabetic, an injection of a corticosteroid can raise your blood sugar level, requiring more insulin for a brief period of time. This may necessitate careful blood sugar maintenance. If the elevated sugars are not able to be controlled, contact your diabetic doctor for guidance.

## 2023-07-31 NOTE — PROGRESS NOTES
Assessment  Diagnoses and all orders for this visit:    Localized primary osteoarthritis of right shoulder region    Chronic right shoulder pain        Discussion and Plan:    The patient has an examination consistent with right shoulder osteoarthritis. I have discussed with the patient the pathophysiology of this diagnosis and reviewed how the examination correlates with this diagnosis. Surgical vs conservative treatment options were discussed at length and after discussing these treatment options, the patient elected for a CS injection today. Injection can be repeated in 4-6 mos if needed. Explained the definitive treatment would be total shoulder arthoplasty. Patient declined a referral to PT. He will call if he wants a referral.     I did review that the 3 metal anchors for his pectoralis major repair may present a problem for later reconstruction with a stemmed implant, just something to consider if in the future he requires arthroplasty    Subjective:   Patient ID: Danie Crane is a 43 y.o. male      HPI  The patient presents with a chief complaint of right shoulder pain. The pain began several year(s) ago and is not associated with an acute injury. Patient has right shoulder RTC repair and labral repair about 15 yrs ago by an unknown physician and right pectoralis repair by Dr. Reji Panda in 2010. The patient describes the pain as aching, dull and sharp in intensity,  intermittent in timing, and localizes the pain to the  right globally. The pain is worse with movement and overuse and relieved by rest.  The pain is not associated with numbness and tingling. The pain is not associated with constitutional symptoms. The patient is awoken at night by the pain.           The following portions of the patient's history were reviewed and updated as appropriate: allergies, current medications, past family history, past medical history, past social history, past surgical history and problem list.      Objective:  /76 (BP Location: Left arm, Patient Position: Sitting, Cuff Size: Large)   Pulse 69   Ht 5' 10" (1.778 m) Comment: verbal  Wt 106 kg (233 lb)   BMI 33.43 kg/m²       Right Shoulder Exam     Range of Motion   External rotation: 20   Forward flexion: 170   Internal rotation 0 degrees: Lumbar     Muscle Strength   Abduction: 5/5   External rotation: 5/5     Other   Erythema: absent  Sensation: normal  Pulse: present              Physical Exam  Vitals reviewed. Constitutional:       Appearance: He is well-developed. HENT:      Head: Normocephalic. Eyes:      Pupils: Pupils are equal, round, and reactive to light. Pulmonary:      Effort: Pulmonary effort is normal.   Abdominal:      General: Abdomen is flat. There is no distension. Skin:     General: Skin is warm and dry. Large joint arthrocentesis: R glenohumeral  [place] Protocol:  Consent: Verbal consent obtained. Consent given by: patient  Patient understanding: patient states understanding of the procedure being performed  Site marked: the operative site was marked  Patient identity confirmed: verbally with patient    Supporting Documentation  Indications: pain   Procedure Details  Location: shoulder - R glenohumeral  Needle size: 22 G  Ultrasound guidance: no  Approach: posterior  Medications administered: 2 mL bupivacaine 0.25 %; 6 mg betamethasone acetate-betamethasone sodium phosphate 6 (3-3) mg/mL    Patient tolerance: patient tolerated the procedure well with no immediate complications  Dressing:  Sterile dressing applied        I have personally reviewed pertinent films in PACS and my interpretation is as follows.   Right shoulder x-rays demonstrates moderate degenerative changes with large inferior osteophyte, 3 anchors from prior surgery humeral shaft, no fracture or dislocation    Scribe Attestation    I,:  Joel Vasquez am acting as a scribe while in the presence of the attending physician.:       I,: Nena Anders MD personally performed the services described in this documentation    as scribed in my presence.:

## 2023-08-28 ENCOUNTER — APPOINTMENT (OUTPATIENT)
Dept: LAB | Facility: CLINIC | Age: 43
End: 2023-08-28
Payer: COMMERCIAL

## 2023-08-28 ENCOUNTER — HOSPITAL ENCOUNTER (OUTPATIENT)
Dept: RADIOLOGY | Facility: HOSPITAL | Age: 43
Discharge: HOME | End: 2023-08-28
Payer: COMMERCIAL

## 2023-08-28 DIAGNOSIS — M54.50 CHRONIC LEFT-SIDED LOW BACK PAIN WITHOUT SCIATICA: ICD-10-CM

## 2023-08-28 DIAGNOSIS — G89.29 CHRONIC LEFT-SIDED LOW BACK PAIN WITHOUT SCIATICA: ICD-10-CM

## 2023-08-28 PROCEDURE — 72110 X-RAY EXAM L-2 SPINE 4/>VWS: CPT

## 2023-08-28 PROCEDURE — 72202 X-RAY EXAM SI JOINTS 3/> VWS: CPT

## 2023-08-28 PROCEDURE — 73503 X-RAY EXAM HIP UNI 4/> VIEWS: CPT

## 2023-10-06 DIAGNOSIS — I10 ESSENTIAL HYPERTENSION: ICD-10-CM

## 2023-10-06 RX ORDER — OLMESARTAN MEDOXOMIL 20 MG/1
20 TABLET ORAL DAILY
Qty: 30 TABLET | Refills: 3 | Status: SHIPPED | OUTPATIENT
Start: 2023-10-06

## 2023-11-25 DIAGNOSIS — F43.23 PERSISTENT ADJUSTMENT DISORDER WITH MIXED ANXIETY AND DEPRESSED MOOD: ICD-10-CM

## 2024-01-27 DIAGNOSIS — M75.41 SHOULDER IMPINGEMENT SYNDROME, RIGHT: ICD-10-CM

## 2024-01-29 RX ORDER — METHOCARBAMOL 750 MG/1
750 TABLET, FILM COATED ORAL
Qty: 30 TABLET | Refills: 2 | Status: SHIPPED | OUTPATIENT
Start: 2024-01-29

## 2024-02-07 DIAGNOSIS — I10 ESSENTIAL HYPERTENSION: ICD-10-CM

## 2024-02-07 RX ORDER — OLMESARTAN MEDOXOMIL 20 MG/1
20 TABLET ORAL DAILY
Qty: 30 TABLET | Refills: 5 | Status: SHIPPED | OUTPATIENT
Start: 2024-02-07

## 2024-02-21 PROBLEM — J32.9 SINUSITIS: Status: RESOLVED | Noted: 2018-12-12 | Resolved: 2024-02-21

## 2024-06-04 DIAGNOSIS — F43.23 PERSISTENT ADJUSTMENT DISORDER WITH MIXED ANXIETY AND DEPRESSED MOOD: ICD-10-CM

## 2024-08-02 ENCOUNTER — OFFICE VISIT (OUTPATIENT)
Dept: FAMILY MEDICINE CLINIC | Facility: CLINIC | Age: 44
End: 2024-08-02
Payer: COMMERCIAL

## 2024-08-02 VITALS
RESPIRATION RATE: 18 BRPM | DIASTOLIC BLOOD PRESSURE: 100 MMHG | OXYGEN SATURATION: 98 % | HEART RATE: 88 BPM | HEIGHT: 70 IN | WEIGHT: 266.8 LBS | TEMPERATURE: 97.8 F | BODY MASS INDEX: 38.2 KG/M2 | SYSTOLIC BLOOD PRESSURE: 154 MMHG

## 2024-08-02 DIAGNOSIS — K57.92 DIVERTICULITIS: Primary | ICD-10-CM

## 2024-08-02 DIAGNOSIS — K57.90 DIVERTICULAR DISEASE: ICD-10-CM

## 2024-08-02 PROCEDURE — 99214 OFFICE O/P EST MOD 30 MIN: CPT | Performed by: FAMILY MEDICINE

## 2024-08-02 RX ORDER — METRONIDAZOLE 500 MG/1
500 TABLET ORAL EVERY 8 HOURS SCHEDULED
Qty: 21 TABLET | Refills: 0 | Status: SHIPPED | OUTPATIENT
Start: 2024-08-02 | End: 2024-08-09

## 2024-08-02 RX ORDER — CIPROFLOXACIN 500 MG/1
500 TABLET, FILM COATED ORAL EVERY 12 HOURS SCHEDULED
Qty: 14 TABLET | Refills: 0 | Status: SHIPPED | OUTPATIENT
Start: 2024-08-02 | End: 2024-08-09

## 2024-08-02 NOTE — PROGRESS NOTES
"Ambulatory Visit  Name: Rolan Shultz      : 1980      MRN: [id number]  Encounter Provider: Keagan Fitzgerald DO  Encounter Date: 2024   Encounter department: [facility]    Assessment & Plan   1. Diverticulitis  -     CT abdomen pelvis wo contrast; Future; Expected date: 2024  -     ciprofloxacin (CIPRO) 500 mg tablet; Take 1 tablet (500 mg total) by mouth every 12 (twelve) hours for 7 days  -     metroNIDAZOLE (FLAGYL) 500 mg tablet; Take 1 tablet (500 mg total) by mouth every 8 (eight) hours for 7 days  -     CBC and Platelet; Future  -     Basic metabolic panel; Future  2. Diverticular disease  Assessment & Plan:  Suspect acute diverticulitis based on symptoms and history. Will check labs, CT A/P and start cipro and flagyl for one week. Follow up if not improved.     History of Present Illness     HPI    Alternating Tylenol and Ibuprofen since yesterday morning for LLQ abdominal pain, painful to palpation. No changes in bowel habits. Last flare was 2-3 years ago.     Review of Systems    Objective     /100 (BP Location: Left arm, Patient Position: Sitting, Cuff Size: Large)   Pulse 88   Temp 97.8 °F (36.6 °C) (Temporal)   Resp 18   Ht 5' 10\" (1.778 m)   Wt 121 kg (266 lb 12.8 oz)   SpO2 98%   BMI 38.28 kg/m²     Physical Exam  Vitals reviewed.   Constitutional:       Appearance: Normal appearance.   Cardiovascular:      Rate and Rhythm: Normal rate and regular rhythm.      Heart sounds: Normal heart sounds.   Pulmonary:      Effort: Pulmonary effort is normal.      Breath sounds: Normal breath sounds.   Abdominal:      General: Abdomen is flat. Bowel sounds are normal. There is no distension.      Palpations: Abdomen is soft. There is no mass.      Tenderness: There is abdominal tenderness (LLQ). There is guarding. There is no rebound.      Hernia: No hernia is present.   Skin:     General: Skin is warm and dry.      Capillary Refill: Capillary refill takes less " than 2 seconds.   Neurological:      General: No focal deficit present.      Mental Status: He is alert and oriented to person, place, and time.   Psychiatric:         Mood and Affect: Mood normal.         Behavior: Behavior normal.       Administrative Statements

## 2024-08-03 ENCOUNTER — APPOINTMENT (OUTPATIENT)
Dept: LAB | Facility: HOSPITAL | Age: 44
End: 2024-08-03
Payer: COMMERCIAL

## 2024-08-03 ENCOUNTER — HOSPITAL ENCOUNTER (OUTPATIENT)
Dept: CT IMAGING | Facility: HOSPITAL | Age: 44
Discharge: HOME | End: 2024-08-03
Payer: COMMERCIAL

## 2024-08-03 DIAGNOSIS — K57.92 DIVERTICULITIS: ICD-10-CM

## 2024-08-03 LAB
ANION GAP SERPL CALCULATED.3IONS-SCNC: 5 MMOL/L (ref 4–13)
BUN SERPL-MCNC: 16 MG/DL (ref 5–25)
CALCIUM SERPL-MCNC: 9.1 MG/DL (ref 8.4–10.2)
CHLORIDE SERPL-SCNC: 103 MMOL/L (ref 96–108)
CO2 SERPL-SCNC: 30 MMOL/L (ref 21–32)
CREAT SERPL-MCNC: 0.99 MG/DL (ref 0.6–1.3)
ERYTHROCYTE [DISTWIDTH] IN BLOOD BY AUTOMATED COUNT: 13.5 % (ref 11.6–15.1)
GFR SERPL CREATININE-BSD FRML MDRD: 92 ML/MIN/1.73SQ M
GLUCOSE P FAST SERPL-MCNC: 96 MG/DL (ref 65–99)
HCT VFR BLD AUTO: 43.6 % (ref 36.5–49.3)
HGB BLD-MCNC: 14.5 G/DL (ref 12–17)
MCH RBC QN AUTO: 30.3 PG (ref 26.8–34.3)
MCHC RBC AUTO-ENTMCNC: 33.3 G/DL (ref 31.4–37.4)
MCV RBC AUTO: 91 FL (ref 82–98)
PLATELET # BLD AUTO: 155 THOUSANDS/UL (ref 149–390)
PMV BLD AUTO: 10.6 FL (ref 8.9–12.7)
POTASSIUM SERPL-SCNC: 4.1 MMOL/L (ref 3.5–5.3)
RBC # BLD AUTO: 4.79 MILLION/UL (ref 3.88–5.62)
SODIUM SERPL-SCNC: 138 MMOL/L (ref 135–147)
WBC # BLD AUTO: 7.07 THOUSAND/UL (ref 4.31–10.16)

## 2024-08-03 PROCEDURE — 85027 COMPLETE CBC AUTOMATED: CPT

## 2024-08-03 PROCEDURE — 74176 CT ABD & PELVIS W/O CONTRAST: CPT

## 2024-08-03 PROCEDURE — 80048 BASIC METABOLIC PNL TOTAL CA: CPT

## 2024-08-03 PROCEDURE — 36415 COLL VENOUS BLD VENIPUNCTURE: CPT

## 2024-08-05 NOTE — ASSESSMENT & PLAN NOTE
Suspect acute diverticulitis based on symptoms and history. Will check labs, CT A/P and start cipro and flagyl for one week. Follow up if not improved.

## 2024-08-09 DIAGNOSIS — I10 ESSENTIAL HYPERTENSION: ICD-10-CM

## 2024-08-09 RX ORDER — OLMESARTAN MEDOXOMIL 20 MG/1
20 TABLET ORAL DAILY
Qty: 30 TABLET | Refills: 5 | Status: SHIPPED | OUTPATIENT
Start: 2024-08-09

## 2024-09-09 DIAGNOSIS — F43.23 PERSISTENT ADJUSTMENT DISORDER WITH MIXED ANXIETY AND DEPRESSED MOOD: ICD-10-CM

## 2024-12-11 DIAGNOSIS — F43.23 PERSISTENT ADJUSTMENT DISORDER WITH MIXED ANXIETY AND DEPRESSED MOOD: ICD-10-CM

## 2025-02-08 DIAGNOSIS — I10 ESSENTIAL HYPERTENSION: ICD-10-CM

## 2025-02-10 RX ORDER — OLMESARTAN MEDOXOMIL 20 MG/1
20 TABLET ORAL DAILY
Qty: 30 TABLET | Refills: 5 | Status: SHIPPED | OUTPATIENT
Start: 2025-02-10

## 2025-06-30 ENCOUNTER — OFFICE VISIT (OUTPATIENT)
Dept: FAMILY MEDICINE CLINIC | Facility: CLINIC | Age: 45
End: 2025-06-30
Payer: COMMERCIAL

## 2025-06-30 VITALS
WEIGHT: 219 LBS | SYSTOLIC BLOOD PRESSURE: 120 MMHG | DIASTOLIC BLOOD PRESSURE: 80 MMHG | BODY MASS INDEX: 31.35 KG/M2 | HEIGHT: 70 IN | HEART RATE: 76 BPM | OXYGEN SATURATION: 97 % | RESPIRATION RATE: 16 BRPM | TEMPERATURE: 98 F

## 2025-06-30 DIAGNOSIS — A69.20 ERYTHEMA MIGRANS (LYME DISEASE): Primary | ICD-10-CM

## 2025-06-30 PROCEDURE — 99213 OFFICE O/P EST LOW 20 MIN: CPT | Performed by: NURSE PRACTITIONER

## 2025-06-30 RX ORDER — DOXYCYCLINE HYCLATE 100 MG
100 TABLET ORAL 2 TIMES DAILY
Qty: 28 TABLET | Refills: 0 | Status: SHIPPED | OUTPATIENT
Start: 2025-06-30 | End: 2025-07-14

## 2025-06-30 NOTE — PROGRESS NOTES
"Name: Rolan Shultz      : 1980      MRN: [id number]  Encounter Provider: PAULINA Mendes  Encounter Date: 2025   Encounter department: [facility]  :  Assessment & Plan  Erythema migrans (Lyme disease)  Start doxycycline.   Advised this medication will increase sun sensitivity, and sunburn. Take extra precautions if outdoors.   Hold MVI for 2 weeks while taking doxycycline.   Don't lay flat for 30-60 minutes after taking this medication.   Call if any new symptoms develop, he is aware of lyme disease symptoms, as he has had lyme disease in the past.   Call if any difficulty tolerating medication.   Orders:  •  doxycycline hyclate (VIBRA-TABS) 100 mg tablet; Take 1 tablet (100 mg total) by mouth 2 (two) times a day for 14 days          Depression Screening and Follow-up Plan: Patient was screened for depression during today's encounter. They screened negative with a PHQ-9 score of 0.        History of Present Illness   Rolan Shultz is a 44 year old male presenting today for rash.     Last week noted red/sore spot on left upper arm.   Has rash in this area 2 days later that has been growing in this area.     Does not recall getting bit by anything.     Has a dog.   [place] trail.   Yard work.     Other wise is feeling well.              Review of Systems   Constitutional: Negative.    Musculoskeletal: Negative.    Skin:  Positive for rash.       Objective   /80   Pulse 76   Temp 98 °F (36.7 °C) (Temporal)   Resp 16   Ht 5' 10\" (1.778 m)   Wt 99.3 kg (219 lb)   SpO2 97%   BMI 31.42 kg/m²      Physical Exam  Vitals and nursing note reviewed.   Constitutional:       Appearance: Normal appearance.     Cardiovascular:      Rate and Rhythm: Normal rate and regular rhythm.      Heart sounds: No murmur heard.  Pulmonary:      Effort: Pulmonary effort is normal.      Breath sounds: Normal breath sounds.     Skin:     Comments: Left upper arm, bicep 6 cm x 14 cm flat, " red, annular rash consistent with erythema migrans rash.     Neurological:      Mental Status: He is alert.     Psychiatric:         Mood and Affect: Mood normal.       Current Medications[1]          [1]    Current Outpatient Medications:   •  ascorbic acid (VITAMIN C) 250 mg tablet, Take 250 mg by mouth in the morning., Disp: , Rfl:   •  doxycycline hyclate (VIBRA-TABS) 100 mg tablet, Take 1 tablet (100 mg total) by mouth 2 (two) times a day for 14 days, Disp: 28 tablet, Rfl: 0  •  fluticasone (FLONASE) 50 mcg/act nasal spray, 2 sprays into each nostril daily, Disp: 16 g, Rfl: 3  •  methocarbamol (ROBAXIN) 750 mg tablet, TAKE ONE TABLET BY MOUTH AT BEDTIME AS NEEDED FOR MUSCLE SPASMS, Disp: 30 tablet, Rfl: 2  •  multivitamin (THERAGRAN) TABS, Take 1 tablet by mouth in the morning., Disp: , Rfl:   •  olmesartan (BENICAR) 20 mg tablet, TAKE ONE TABLET BY MOUTH EVERY DAY, Disp: 30 tablet, Rfl: 5  •  sertraline (ZOLOFT) 50 mg tablet, TAKE ONE TABLET BY MOUTH EVERY DAY, Disp: 30 tablet, Rfl: 5